# Patient Record
Sex: MALE | Race: WHITE | NOT HISPANIC OR LATINO | Employment: FULL TIME | URBAN - METROPOLITAN AREA
[De-identification: names, ages, dates, MRNs, and addresses within clinical notes are randomized per-mention and may not be internally consistent; named-entity substitution may affect disease eponyms.]

---

## 2017-07-11 ENCOUNTER — HOSPITAL ENCOUNTER (OUTPATIENT)
Dept: LAB | Facility: MEDICAL CENTER | Age: 52
End: 2017-07-11
Attending: EMERGENCY MEDICINE
Payer: COMMERCIAL

## 2017-07-11 LAB
ALBUMIN SERPL BCP-MCNC: 4 G/DL (ref 3.2–4.9)
ALBUMIN/GLOB SERPL: 1.7 G/DL
ALP SERPL-CCNC: 57 U/L (ref 30–99)
ALT SERPL-CCNC: 45 U/L (ref 2–50)
ANION GAP SERPL CALC-SCNC: 8 MMOL/L (ref 0–11.9)
AST SERPL-CCNC: 30 U/L (ref 12–45)
BASOPHILS # BLD AUTO: 0.8 % (ref 0–1.8)
BASOPHILS # BLD: 0.08 K/UL (ref 0–0.12)
BILIRUB SERPL-MCNC: 0.8 MG/DL (ref 0.1–1.5)
BUN SERPL-MCNC: 17 MG/DL (ref 8–22)
CALCIUM SERPL-MCNC: 9.5 MG/DL (ref 8.5–10.5)
CHLORIDE SERPL-SCNC: 106 MMOL/L (ref 96–112)
CHOLEST SERPL-MCNC: 113 MG/DL (ref 100–199)
CO2 SERPL-SCNC: 24 MMOL/L (ref 20–33)
CREAT SERPL-MCNC: 0.92 MG/DL (ref 0.5–1.4)
EOSINOPHIL # BLD AUTO: 0.28 K/UL (ref 0–0.51)
EOSINOPHIL NFR BLD: 2.9 % (ref 0–6.9)
ERYTHROCYTE [DISTWIDTH] IN BLOOD BY AUTOMATED COUNT: 46.6 FL (ref 35.9–50)
EST. AVERAGE GLUCOSE BLD GHB EST-MCNC: 117 MG/DL
ESTRADIOL SERPL-MCNC: 39 PG/ML
GFR SERPL CREATININE-BSD FRML MDRD: >60 ML/MIN/1.73 M 2
GGT SERPL-CCNC: 46 U/L (ref 7–51)
GLOBULIN SER CALC-MCNC: 2.4 G/DL (ref 1.9–3.5)
GLUCOSE SERPL-MCNC: 102 MG/DL (ref 65–99)
HBA1C MFR BLD: 5.7 % (ref 0–5.6)
HCT VFR BLD AUTO: 45.4 % (ref 42–52)
HCYS SERPL-SCNC: 13.06 UMOL/L
HDLC SERPL-MCNC: 50 MG/DL
HGB BLD-MCNC: 15.8 G/DL (ref 14–18)
IMM GRANULOCYTES # BLD AUTO: 0.05 K/UL (ref 0–0.11)
IMM GRANULOCYTES NFR BLD AUTO: 0.5 % (ref 0–0.9)
LDH SERPL-CCNC: 214 U/L (ref 107–266)
LDLC SERPL CALC-MCNC: 49 MG/DL
LYMPHOCYTES # BLD AUTO: 2.17 K/UL (ref 1–4.8)
LYMPHOCYTES NFR BLD: 22.5 % (ref 22–41)
MCH RBC QN AUTO: 30.9 PG (ref 27–33)
MCHC RBC AUTO-ENTMCNC: 34.8 G/DL (ref 33.7–35.3)
MCV RBC AUTO: 88.8 FL (ref 81.4–97.8)
MONOCYTES # BLD AUTO: 0.65 K/UL (ref 0–0.85)
MONOCYTES NFR BLD AUTO: 6.7 % (ref 0–13.4)
NEUTROPHILS # BLD AUTO: 6.4 K/UL (ref 1.82–7.42)
NEUTROPHILS NFR BLD: 66.6 % (ref 44–72)
NRBC # BLD AUTO: 0 K/UL
NRBC BLD AUTO-RTO: 0 /100 WBC
PHOSPHATE SERPL-MCNC: 3.3 MG/DL (ref 2.5–4.5)
PLATELET # BLD AUTO: 195 K/UL (ref 164–446)
PMV BLD AUTO: 11.6 FL (ref 9–12.9)
POTASSIUM SERPL-SCNC: 3.9 MMOL/L (ref 3.6–5.5)
PROT SERPL-MCNC: 6.4 G/DL (ref 6–8.2)
PSA SERPL-MCNC: 0.46 NG/ML (ref 0–4)
RBC # BLD AUTO: 5.11 M/UL (ref 4.7–6.1)
SODIUM SERPL-SCNC: 138 MMOL/L (ref 135–145)
T3FREE SERPL-MCNC: 2.96 PG/ML (ref 2.4–4.2)
TRIGL SERPL-MCNC: 72 MG/DL (ref 0–149)
URATE SERPL-MCNC: 7.8 MG/DL (ref 2.5–8.3)
WBC # BLD AUTO: 9.6 K/UL (ref 4.8–10.8)

## 2017-07-11 PROCEDURE — 82977 ASSAY OF GGT: CPT

## 2017-07-11 PROCEDURE — 84481 FREE ASSAY (FT-3): CPT

## 2017-07-11 PROCEDURE — 84305 ASSAY OF SOMATOMEDIN: CPT

## 2017-07-11 PROCEDURE — 84270 ASSAY OF SEX HORMONE GLOBUL: CPT

## 2017-07-11 PROCEDURE — 84100 ASSAY OF PHOSPHORUS: CPT

## 2017-07-11 PROCEDURE — 83615 LACTATE (LD) (LDH) ENZYME: CPT

## 2017-07-11 PROCEDURE — 80061 LIPID PANEL: CPT

## 2017-07-11 PROCEDURE — 80053 COMPREHEN METABOLIC PANEL: CPT

## 2017-07-11 PROCEDURE — 84153 ASSAY OF PSA TOTAL: CPT

## 2017-07-11 PROCEDURE — 83090 ASSAY OF HOMOCYSTEINE: CPT

## 2017-07-11 PROCEDURE — 82670 ASSAY OF TOTAL ESTRADIOL: CPT

## 2017-07-11 PROCEDURE — 85025 COMPLETE CBC W/AUTO DIFF WBC: CPT

## 2017-07-11 PROCEDURE — 36415 COLL VENOUS BLD VENIPUNCTURE: CPT

## 2017-07-11 PROCEDURE — 84402 ASSAY OF FREE TESTOSTERONE: CPT

## 2017-07-11 PROCEDURE — 84550 ASSAY OF BLOOD/URIC ACID: CPT

## 2017-07-11 PROCEDURE — 83036 HEMOGLOBIN GLYCOSYLATED A1C: CPT

## 2017-07-11 PROCEDURE — 84403 ASSAY OF TOTAL TESTOSTERONE: CPT

## 2017-07-13 LAB
IGF-I SERPL-MCNC: 99 NG/ML (ref 68–245)
SHBG SERPL-SCNC: 17 NMOL/L (ref 11–80)
TESTOST FREE MFR SERPL: 2.4 % (ref 1.6–2.9)
TESTOST FREE SERPL-MCNC: 70 PG/ML (ref 47–244)
TESTOST SERPL-MCNC: 290 NG/DL (ref 300–890)

## 2021-01-15 ENCOUNTER — APPOINTMENT (RX ONLY)
Dept: URBAN - METROPOLITAN AREA CLINIC 4 | Facility: CLINIC | Age: 56
Setting detail: DERMATOLOGY
End: 2021-01-15

## 2021-01-15 DIAGNOSIS — L82.1 OTHER SEBORRHEIC KERATOSIS: ICD-10-CM

## 2021-01-15 DIAGNOSIS — L81.4 OTHER MELANIN HYPERPIGMENTATION: ICD-10-CM

## 2021-01-15 DIAGNOSIS — D22 MELANOCYTIC NEVI: ICD-10-CM

## 2021-01-15 DIAGNOSIS — L71.8 OTHER ROSACEA: ICD-10-CM

## 2021-01-15 DIAGNOSIS — D18.0 HEMANGIOMA: ICD-10-CM

## 2021-01-15 DIAGNOSIS — L85.3 XEROSIS CUTIS: ICD-10-CM

## 2021-01-15 DIAGNOSIS — F42.4 EXCORIATION (SKIN-PICKING) DISORDER: ICD-10-CM

## 2021-01-15 DIAGNOSIS — I78.8 OTHER DISEASES OF CAPILLARIES: ICD-10-CM

## 2021-01-15 PROBLEM — D22.72 MELANOCYTIC NEVI OF LEFT LOWER LIMB, INCLUDING HIP: Status: ACTIVE | Noted: 2021-01-15

## 2021-01-15 PROBLEM — S00.80XA UNSPECIFIED SUPERFICIAL INJURY OF OTHER PART OF HEAD, INITIAL ENCOUNTER: Status: ACTIVE | Noted: 2021-01-15

## 2021-01-15 PROBLEM — D22.71 MELANOCYTIC NEVI OF RIGHT LOWER LIMB, INCLUDING HIP: Status: ACTIVE | Noted: 2021-01-15

## 2021-01-15 PROBLEM — D22.5 MELANOCYTIC NEVI OF TRUNK: Status: ACTIVE | Noted: 2021-01-15

## 2021-01-15 PROBLEM — D23.62 OTHER BENIGN NEOPLASM OF SKIN OF LEFT UPPER LIMB, INCLUDING SHOULDER: Status: ACTIVE | Noted: 2021-01-15

## 2021-01-15 PROBLEM — D22.62 MELANOCYTIC NEVI OF LEFT UPPER LIMB, INCLUDING SHOULDER: Status: ACTIVE | Noted: 2021-01-15

## 2021-01-15 PROBLEM — D22.61 MELANOCYTIC NEVI OF RIGHT UPPER LIMB, INCLUDING SHOULDER: Status: ACTIVE | Noted: 2021-01-15

## 2021-01-15 PROBLEM — D18.01 HEMANGIOMA OF SKIN AND SUBCUTANEOUS TISSUE: Status: ACTIVE | Noted: 2021-01-15

## 2021-01-15 PROBLEM — D23.71 OTHER BENIGN NEOPLASM OF SKIN OF RIGHT LOWER LIMB, INCLUDING HIP: Status: ACTIVE | Noted: 2021-01-15

## 2021-01-15 PROCEDURE — ? COUNSELING

## 2021-01-15 PROCEDURE — 99203 OFFICE O/P NEW LOW 30 MIN: CPT

## 2021-01-15 ASSESSMENT — LOCATION DETAILED DESCRIPTION DERM
LOCATION DETAILED: RIGHT MEDIAL MALAR CHEEK
LOCATION DETAILED: RIGHT ANTERIOR DISTAL THIGH
LOCATION DETAILED: RIGHT POSTERIOR SHOULDER
LOCATION DETAILED: RIGHT LATERAL SUPERIOR CHEST
LOCATION DETAILED: LEFT DISTAL POSTERIOR THIGH
LOCATION DETAILED: RIGHT VENTRAL PROXIMAL FOREARM
LOCATION DETAILED: LEFT POSTERIOR SHOULDER
LOCATION DETAILED: LEFT SUPERIOR LATERAL UPPER BACK
LOCATION DETAILED: RIGHT CENTRAL MALAR CHEEK
LOCATION DETAILED: RIGHT PROXIMAL CALF
LOCATION DETAILED: RIGHT PROXIMAL DORSAL FOREARM
LOCATION DETAILED: LEFT LATERAL SUPERIOR CHEST
LOCATION DETAILED: LEFT PROXIMAL POSTERIOR UPPER ARM
LOCATION DETAILED: LEFT MEDIAL MALAR CHEEK
LOCATION DETAILED: RIGHT VENTRAL DISTAL FOREARM
LOCATION DETAILED: LEFT MEDIAL SUPERIOR CHEST
LOCATION DETAILED: LEFT PROXIMAL CALF
LOCATION DETAILED: RIGHT DISTAL POSTERIOR THIGH
LOCATION DETAILED: PERIUMBILICAL SKIN
LOCATION DETAILED: LEFT INFERIOR CENTRAL MALAR CHEEK
LOCATION DETAILED: LEFT ANTERIOR DISTAL THIGH
LOCATION DETAILED: LEFT FOREHEAD
LOCATION DETAILED: LEFT LATERAL EYEBROW
LOCATION DETAILED: RIGHT MEDIAL INFERIOR CHEST
LOCATION DETAILED: LEFT VENTRAL DISTAL FOREARM
LOCATION DETAILED: LEFT VENTRAL PROXIMAL FOREARM
LOCATION DETAILED: RIGHT DISTAL POSTERIOR UPPER ARM
LOCATION DETAILED: LEFT PROXIMAL DORSAL FOREARM

## 2021-01-15 ASSESSMENT — LOCATION SIMPLE DESCRIPTION DERM
LOCATION SIMPLE: LEFT CHEEK
LOCATION SIMPLE: LEFT THIGH
LOCATION SIMPLE: RIGHT SHOULDER
LOCATION SIMPLE: CHEST
LOCATION SIMPLE: RIGHT THIGH
LOCATION SIMPLE: RIGHT POSTERIOR THIGH
LOCATION SIMPLE: RIGHT CALF
LOCATION SIMPLE: RIGHT UPPER ARM
LOCATION SIMPLE: LEFT FOREARM
LOCATION SIMPLE: LEFT UPPER BACK
LOCATION SIMPLE: LEFT EYEBROW
LOCATION SIMPLE: RIGHT CHEEK
LOCATION SIMPLE: LEFT POSTERIOR THIGH
LOCATION SIMPLE: LEFT SHOULDER
LOCATION SIMPLE: RIGHT FOREARM
LOCATION SIMPLE: LEFT CALF
LOCATION SIMPLE: ABDOMEN
LOCATION SIMPLE: LEFT FOREHEAD
LOCATION SIMPLE: LEFT UPPER ARM

## 2021-01-15 ASSESSMENT — LOCATION ZONE DERM
LOCATION ZONE: LEG
LOCATION ZONE: ARM
LOCATION ZONE: FACE
LOCATION ZONE: TRUNK

## 2021-03-15 DIAGNOSIS — Z23 NEED FOR VACCINATION: ICD-10-CM

## 2021-11-05 ENCOUNTER — HOSPITAL ENCOUNTER (OUTPATIENT)
Dept: LAB | Facility: MEDICAL CENTER | Age: 56
End: 2021-11-05
Attending: EMERGENCY MEDICINE
Payer: COMMERCIAL

## 2021-11-05 LAB
ALBUMIN SERPL BCP-MCNC: 4.4 G/DL (ref 3.2–4.9)
ALBUMIN/GLOB SERPL: 1.5 G/DL
ALP SERPL-CCNC: 50 U/L (ref 30–99)
ALT SERPL-CCNC: 36 U/L (ref 2–50)
ANION GAP SERPL CALC-SCNC: 14 MMOL/L (ref 7–16)
AST SERPL-CCNC: 27 U/L (ref 12–45)
BASOPHILS # BLD AUTO: 1.1 % (ref 0–1.8)
BASOPHILS # BLD: 0.09 K/UL (ref 0–0.12)
BILIRUB SERPL-MCNC: 0.8 MG/DL (ref 0.1–1.5)
BUN SERPL-MCNC: 11 MG/DL (ref 8–22)
CALCIUM SERPL-MCNC: 10.2 MG/DL (ref 8.5–10.5)
CHLORIDE SERPL-SCNC: 98 MMOL/L (ref 96–112)
CHOLEST SERPL-MCNC: 226 MG/DL (ref 100–199)
CO2 SERPL-SCNC: 26 MMOL/L (ref 20–33)
CREAT SERPL-MCNC: 1.14 MG/DL (ref 0.5–1.4)
EOSINOPHIL # BLD AUTO: 0.1 K/UL (ref 0–0.51)
EOSINOPHIL NFR BLD: 1.2 % (ref 0–6.9)
ERYTHROCYTE [DISTWIDTH] IN BLOOD BY AUTOMATED COUNT: 48.8 FL (ref 35.9–50)
ESTRADIOL SERPL-MCNC: 23.9 PG/ML
GLOBULIN SER CALC-MCNC: 2.9 G/DL (ref 1.9–3.5)
GLUCOSE SERPL-MCNC: 89 MG/DL (ref 65–99)
HCT VFR BLD AUTO: 58.9 % (ref 42–52)
HDLC SERPL-MCNC: 44 MG/DL
HGB BLD-MCNC: 19.9 G/DL (ref 14–18)
IMM GRANULOCYTES # BLD AUTO: 0.05 K/UL (ref 0–0.11)
IMM GRANULOCYTES NFR BLD AUTO: 0.6 % (ref 0–0.9)
LDLC SERPL CALC-MCNC: 161 MG/DL
LYMPHOCYTES # BLD AUTO: 1.8 K/UL (ref 1–4.8)
LYMPHOCYTES NFR BLD: 21.7 % (ref 22–41)
MCH RBC QN AUTO: 33.8 PG (ref 27–33)
MCHC RBC AUTO-ENTMCNC: 33.8 G/DL (ref 33.7–35.3)
MCV RBC AUTO: 100 FL (ref 81.4–97.8)
MONOCYTES # BLD AUTO: 0.61 K/UL (ref 0–0.85)
MONOCYTES NFR BLD AUTO: 7.3 % (ref 0–13.4)
NEUTROPHILS # BLD AUTO: 5.65 K/UL (ref 1.82–7.42)
NEUTROPHILS NFR BLD: 68.1 % (ref 44–72)
NRBC # BLD AUTO: 0 K/UL
NRBC BLD-RTO: 0 /100 WBC
PLATELET # BLD AUTO: 165 K/UL (ref 164–446)
PMV BLD AUTO: 12.3 FL (ref 9–12.9)
POTASSIUM SERPL-SCNC: 4.5 MMOL/L (ref 3.6–5.5)
PROT SERPL-MCNC: 7.3 G/DL (ref 6–8.2)
PSA SERPL-MCNC: 1.14 NG/ML (ref 0–4)
RBC # BLD AUTO: 5.89 M/UL (ref 4.7–6.1)
SODIUM SERPL-SCNC: 138 MMOL/L (ref 135–145)
TESTOST SERPL-MCNC: 712 NG/DL (ref 175–781)
TRIGL SERPL-MCNC: 104 MG/DL (ref 0–149)
WBC # BLD AUTO: 8.3 K/UL (ref 4.8–10.8)

## 2021-11-05 PROCEDURE — 84305 ASSAY OF SOMATOMEDIN: CPT

## 2021-11-05 PROCEDURE — 84403 ASSAY OF TOTAL TESTOSTERONE: CPT

## 2021-11-05 PROCEDURE — 80061 LIPID PANEL: CPT

## 2021-11-05 PROCEDURE — 85025 COMPLETE CBC W/AUTO DIFF WBC: CPT

## 2021-11-05 PROCEDURE — 36415 COLL VENOUS BLD VENIPUNCTURE: CPT

## 2021-11-05 PROCEDURE — 82670 ASSAY OF TOTAL ESTRADIOL: CPT

## 2021-11-05 PROCEDURE — 84153 ASSAY OF PSA TOTAL: CPT

## 2021-11-05 PROCEDURE — 80053 COMPREHEN METABOLIC PANEL: CPT

## 2021-11-07 LAB
IGF-I SERPL-MCNC: 114 NG/ML (ref 58–204)
IGF-I Z-SCORE SERPL: -0.1

## 2022-03-11 ENCOUNTER — TELEPHONE (OUTPATIENT)
Dept: CARDIOLOGY | Facility: MEDICAL CENTER | Age: 57
End: 2022-03-11
Payer: COMMERCIAL

## 2022-03-12 NOTE — TELEPHONE ENCOUNTER
Spoke with pt who confirmed last visit with cardio was at [facility], all records have been requested.  Confirmed we have all recent notes and testing in chart.    Pending records.

## 2022-03-16 ENCOUNTER — OFFICE VISIT (OUTPATIENT)
Dept: CARDIOLOGY | Facility: MEDICAL CENTER | Age: 57
End: 2022-03-16
Payer: COMMERCIAL

## 2022-03-16 VITALS
SYSTOLIC BLOOD PRESSURE: 130 MMHG | HEART RATE: 78 BPM | WEIGHT: 239 LBS | DIASTOLIC BLOOD PRESSURE: 80 MMHG | RESPIRATION RATE: 14 BRPM | HEIGHT: 76 IN | OXYGEN SATURATION: 97 % | BODY MASS INDEX: 29.1 KG/M2

## 2022-03-16 DIAGNOSIS — I25.10 CORONARY ARTERY DISEASE INVOLVING NATIVE CORONARY ARTERY OF NATIVE HEART WITHOUT ANGINA PECTORIS: ICD-10-CM

## 2022-03-16 DIAGNOSIS — E78.5 DYSLIPIDEMIA: ICD-10-CM

## 2022-03-16 DIAGNOSIS — I10 ESSENTIAL HYPERTENSION: ICD-10-CM

## 2022-03-16 LAB — EKG IMPRESSION: NORMAL

## 2022-03-16 PROCEDURE — 99204 OFFICE O/P NEW MOD 45 MIN: CPT | Performed by: INTERNAL MEDICINE

## 2022-03-16 PROCEDURE — 93000 ELECTROCARDIOGRAM COMPLETE: CPT | Performed by: INTERNAL MEDICINE

## 2022-03-16 RX ORDER — MINOCYCLINE HYDROCHLORIDE 100 MG/1
CAPSULE ORAL
COMMUNITY
Start: 2022-03-04 | End: 2023-09-29

## 2022-03-16 RX ORDER — ANASTROZOLE 1 MG/1
1 TABLET ORAL DAILY
COMMUNITY
End: 2023-09-29

## 2022-03-16 RX ORDER — ATORVASTATIN CALCIUM 80 MG/1
80 TABLET, FILM COATED ORAL EVERY EVENING
Qty: 90 TABLET | Refills: 3 | Status: SHIPPED | OUTPATIENT
Start: 2022-03-16 | End: 2023-09-22 | Stop reason: SDUPTHER

## 2022-03-16 RX ORDER — VALSARTAN AND HYDROCHLOROTHIAZIDE 320; 12.5 MG/1; MG/1
TABLET, FILM COATED ORAL
COMMUNITY
Start: 2022-02-02 | End: 2023-12-27

## 2022-03-16 RX ORDER — AMLODIPINE BESYLATE 2.5 MG/1
2.5 TABLET ORAL DAILY
COMMUNITY
Start: 2022-02-23 | End: 2023-02-23

## 2022-03-16 RX ORDER — ZOLPIDEM TARTRATE 10 MG/1
TABLET ORAL
COMMUNITY
Start: 2022-01-09

## 2022-03-16 RX ORDER — THYROID 60 MG/1
60 TABLET ORAL DAILY
COMMUNITY
End: 2023-12-14

## 2022-03-16 ASSESSMENT — ENCOUNTER SYMPTOMS
ORTHOPNEA: 0
DOUBLE VISION: 0
COUGH: 0
WEAKNESS: 0
DIARRHEA: 0
IRREGULAR HEARTBEAT: 0
PARESTHESIAS: 0
FEVER: 0
MYALGIAS: 0
PALPITATIONS: 0
BLOATING: 0
SYNCOPE: 0
BACK PAIN: 0
NIGHT SWEATS: 0
SHORTNESS OF BREATH: 0
DIAPHORESIS: 0
PND: 0
EYE REDNESS: 1
NEAR-SYNCOPE: 0
EXCESSIVE DAYTIME SLEEPINESS: 0
BLURRED VISION: 0
LIGHT-HEADEDNESS: 0
SORE THROAT: 0
DYSPNEA ON EXERTION: 0
LOSS OF BALANCE: 0
DECREASED APPETITE: 0
SLEEP DISTURBANCES DUE TO BREATHING: 0
FALLS: 0
CONSTIPATION: 0
HEADACHES: 0
DIZZINESS: 0
FLANK PAIN: 0
VOMITING: 0
NAUSEA: 0
WHEEZING: 0
NUMBNESS: 0

## 2022-03-16 ASSESSMENT — FIBROSIS 4 INDEX: FIB4 SCORE: 1.58

## 2022-03-16 NOTE — PROGRESS NOTES
Cardiology Initial Consultation Note    Date of note:    3/16/2022    Primary Care Provider: Fernando Taylor M.D.  Referring Provider: Fernando Taylor M.D    Patient Name: Phillip Jordan   YOB: 1965  MRN:              [id number]    Chief Complaint   Patient presents with   • Coronary Artery Disease   • Hyperlipidemia   • Hypertension       History of Present Illness: Mr. Phillip Jordan is a 56 y.o. male whose current medical problems include CAD with prior anterior MI with no stent placement, hypertension and dyslipidemia who is here for cardiac consultation for above.  Was previously seen by Dr. Penaloza with last visit in 2014.  Was being followed at [place] [facility] in the interim.    Patient states that he has been doing well from a cardiovascular perspective.  Has not had recurrent symptoms concerning for MI or accelerating angina.  He is currently on aspirin 81 mg daily and Lipitor 40 mg daily.  Was previously on Lipitor 80 but was reduced to 40 about 2 years ago due to well-controlled lipid panel.    In terms of physical activity, has not been exercising since Covid and has gained about 15 pounds.  Is a retired  lives with his wife who is a .    Cardiovascular Risk Factors:  1. Smoking status: Former smoker, quit 2009  2. Type II Diabetes Mellitus: No  Lab Results   Component Value Date/Time    HBA1C 5.7 (H) 07/11/2017 12:16 PM    HBA1C 6.2 (H) 01/28/2016 12:59 PM     3. Hypertension: Yes  4. Dyslipidemia: Yes  Cholesterol,Tot   Date Value Ref Range Status   11/05/2021 226 (H) 100 - 199 mg/dL Final     LDL   Date Value Ref Range Status   11/05/2021 161 (H) <100 mg/dL Final     HDL   Date Value Ref Range Status   11/05/2021 44 >=40 mg/dL Final     Triglycerides   Date Value Ref Range Status   11/05/2021 104 0 - 149 mg/dL Final     5. Family history of early Coronary Artery Disease: Father had a heart attack  6.  Obesity and/or Metabolic Syndrome: BMI  29  7. Sedentary lifestyle: No    Review of Systems   Constitutional: Negative for decreased appetite, diaphoresis, fever, malaise/fatigue and night sweats.   HENT: Negative for congestion and sore throat.    Eyes: Positive for redness. Negative for blurred vision and double vision.   Cardiovascular: Negative for chest pain, cyanosis, dyspnea on exertion, irregular heartbeat, leg swelling, near-syncope, orthopnea, palpitations, paroxysmal nocturnal dyspnea and syncope.   Respiratory: Negative for cough, shortness of breath, sleep disturbances due to breathing and wheezing.    Endocrine: Negative for cold intolerance and heat intolerance.   Musculoskeletal: Negative for back pain, falls and myalgias.   Gastrointestinal: Negative for bloating, constipation, diarrhea, nausea and vomiting.   Genitourinary: Negative for dysuria and flank pain.   Neurological: Negative for excessive daytime sleepiness, dizziness, headaches, light-headedness, loss of balance, numbness, paresthesias and weakness.         Past Medical History:   Diagnosis Date   • CAD (coronary artery disease) 6/6/2012   • Enlarged prostate    • Hyperlipidemia 6/6/2012   • Hypertension          Past Surgical History:   Procedure Laterality Date   • MASTECTOMY      Fpr gynecomastia, right breast   • OTHER NEUROLOGICAL SURG           Current Outpatient Medications   Medication Sig Dispense Refill   • VALACYCLOVIR HCL PO Take 1 mg by mouth.     • amLODIPine (NORVASC) 2.5 MG Tab Take 2.5 mg by mouth every day.     • anastrozole (ARIMIDEX) 1 MG Tab Take 1 mg by mouth every day.     • minocycline (MINOCIN) 100 MG Cap      • thyroid (ARMOUR THYROID) 60 MG Tab Take 60 mg by mouth every day.     • valsartan-hydrochlorothiazide (DIOVAN-HCT) 320-12.5 MG per tablet      • zolpidem (AMBIEN) 10 MG Tab      • aspirin EC (ECOTRIN) 81 MG Tablet Delayed Response Take 1 Tablet by mouth every day. 30 Tablet 0   • atorvastatin (LIPITOR) 80 MG tablet Take 1 Tablet by mouth every  "evening. 90 Tablet 3   • omeprazole (PRILOSEC) 40 MG capsule Take 40 mg by mouth every day.     • coenzyme Q-10 30 MG capsule Take 60 mg by mouth every day. (Patient not taking: Reported on 3/16/2022)       No current facility-administered medications for this visit.         Allergies   Allergen Reactions   • Nkda [No Known Drug Allergy]          History reviewed. No pertinent family history.      Social History     Socioeconomic History   • Marital status:      Spouse name: Not on file   • Number of children: Not on file   • Years of education: Not on file   • Highest education level: Not on file   Occupational History   • Not on file   Tobacco Use   • Smoking status: Former Smoker     Quit date: 2009     Years since quittin.7   • Smokeless tobacco: Never Used   Substance and Sexual Activity   • Alcohol use: Yes     Alcohol/week: 8.4 oz     Types: 14 Glasses of wine per week     Comment: 2 glass of wine daily    • Drug use: No   • Sexual activity: Not on file   Other Topics Concern   • Not on file   Social History Narrative   • Not on file     Social Determinants of Health     Financial Resource Strain: Not on file   Food Insecurity: Not on file   Transportation Needs: Not on file   Physical Activity: Not on file   Stress: Not on file   Social Connections: Not on file   Intimate Partner Violence: Not on file   Housing Stability: Not on file         Physical Exam:  Ambulatory Vitals  /80 (BP Location: Left arm, Patient Position: Sitting, BP Cuff Size: Adult)   Pulse 78   Resp 14   Ht 1.93 m (6' 4\")   Wt 108 kg (239 lb)   SpO2 97%    Oxygen Therapy:  Pulse Oximetry: 97 %  BP Readings from Last 4 Encounters:   22 130/80   14 118/70   13 130/80   04/15/13 159/96       Weight/BMI: Body mass index is 29.09 kg/m².  Wt Readings from Last 4 Encounters:   22 108 kg (239 lb)   14 107 kg (236 lb 6.4 oz)   13 108 kg (238 lb)   04/15/13 104 kg (230 lb)       General: " Well appearing and in no apparent distress  Eyes: nl conjunctiva, no icteric sclera  ENT: wearing a mask, normal external appearance of ears  Neck: no visible JVP,  no carotid bruits  Lungs: normal respiratory effort, CTAB  Heart: RRR, no murmurs, no rubs or gallops,  no edema bilateral lower extremities. No LV/RV heave on cardiac palpatation. 2+ bilateral radial pulses.  2+ bilateral dp pulses.   Abdomen: soft, non tender, non distended, no masses, normal bowel sounds.  No HSM.  Extremities/MSK: no clubbing, no cyanosis  Neurological: No focal sensory deficits  Psychiatric: Appropriate affect, A/O x 3, intact judgement and insight  Skin: Warm extremities      Lab Data Review:  Lab Results   Component Value Date/Time    CHOLSTRLTOT 226 (H) 11/05/2021 12:15 PM     (H) 11/05/2021 12:15 PM    HDL 44 11/05/2021 12:15 PM    TRIGLYCERIDE 104 11/05/2021 12:15 PM       Lab Results   Component Value Date/Time    SODIUM 138 11/05/2021 12:15 PM    POTASSIUM 4.5 11/05/2021 12:15 PM    CHLORIDE 98 11/05/2021 12:15 PM    CO2 26 11/05/2021 12:15 PM    GLUCOSE 89 11/05/2021 12:15 PM    BUN 11 11/05/2021 12:15 PM    CREATININE 1.14 11/05/2021 12:15 PM    CREATININE 1.1 02/26/2009 04:35 AM     Lab Results   Component Value Date/Time    ALKPHOSPHAT 50 11/05/2021 12:15 PM    ASTSGOT 27 11/05/2021 12:15 PM    ALTSGPT 36 11/05/2021 12:15 PM    TBILIRUBIN 0.8 11/05/2021 12:15 PM      Lab Results   Component Value Date/Time    WBC 8.3 11/05/2021 12:15 PM     Lab Results   Component Value Date/Time    HBA1C 5.7 (H) 07/11/2017 12:16 PM    HBA1C 6.2 (H) 01/28/2016 12:59 PM         Cardiac Imaging and Procedures Review:    EKG dated 3/16/2022: My personal interpretation is sinus rhythm    Echo dated 10/9/2019:  <Conclusion>   1. The left ventricle is normal in size and function with no regional wall motion abnormalities and a   n ejection fraction of 55-60% by Dobson's biplane.   2. There are no significant valvular abnormalities  noted.   3. The right ventricle is normal in size, function, and pressure.    [facility] (10/23/2020):   IMPRESSION:   1. Patchy and reduced uptake of the inferior wall which is fixed and suggest   previous inferior infarction.  No reversible defects to suggest ischemia.   2. Ejection fraction calculated at 54%.   3. Global hypokinesis noted.      [facility] (2/27/2009):   IMPRESSION  1.  Single-vessel coronary artery disease with borderline stenosis of  the proximal-mid left anterior descending coronary artery consistent  with ruptured plaque.  2.  Normal left ventricular cineangiogram.  3.  Mild elevation in left heart pressures at rest.        Assessment & Plan     1. Coronary artery disease involving native coronary artery of native heart without angina pectoris  aspirin EC (ECOTRIN) 81 MG Tablet Delayed Response    EC-ECHOCARDIOGRAM COMPLETE W/O CONT    NM-CARDIAC STRESS TEST    atorvastatin (LIPITOR) 80 MG tablet   2. Essential hypertension  EKG    EC-ECHOCARDIOGRAM COMPLETE W/O CONT   3. Dyslipidemia  atorvastatin (LIPITOR) 80 MG tablet    Lipid Profile         Shared Medical Decision Making:  Obtain transthoracic echocardiogram to evaluate underlying cardiac structure and function given history of MI.  Rule out structural or valvular heart abnormality.    Obtain treadmill nuclear cardiac stress test given CAD with known 60 to 70% LAD stenosis.      Patient has not been exercising of late.  Discussed ACC/AHA guidelines of cardio focused exercise with reaching at least 85% of your maximum predicted heart rate for a minimum of 150 minutes/week to maintain healthy weight and reduce cardiovascular risk factors for atherosclerotic heart disease.    Lipid panel reviewed with the patient which shows significantly elevated LDL.  Is currently on Lipitor 40 mg daily, increase Lipitor to 80 mg daily.  Repeat lipid panel in 3 months.    Continue aspirin 81 mg daily.    Blood pressure well controlled.  Continue  amlodipine 2.5 mg and valsartan-hydrochlorothiazide 320-12.5 mg daily.      All of patient's excellent questions were answered to the best of my knowledge and to his satisfaction.  It was a pleasure seeing Mr. Phillip Jordan in my clinic today. Return in about 1 year (around 3/16/2023). Patient is aware to call the cardiology clinic with any questions or concerns.      Shaq Felder MD  [facility]  [facility], [facility]  [address] [address]  Phone: [phone]  Fax: [phone]    Please note that this dictation was created using voice recognition software. I have made every reasonable attempt to correct obvious errors, but it is possible there are errors of grammar and possibly content that I did not discover before finalizing the note.

## 2022-03-26 ENCOUNTER — OFFICE VISIT (OUTPATIENT)
Dept: URGENT CARE | Facility: CLINIC | Age: 57
End: 2022-03-26
Payer: COMMERCIAL

## 2022-03-26 ENCOUNTER — HOSPITAL ENCOUNTER (EMERGENCY)
Facility: MEDICAL CENTER | Age: 57
End: 2022-03-26
Attending: EMERGENCY MEDICINE
Payer: COMMERCIAL

## 2022-03-26 VITALS
WEIGHT: 242.51 LBS | BODY MASS INDEX: 29.53 KG/M2 | SYSTOLIC BLOOD PRESSURE: 153 MMHG | DIASTOLIC BLOOD PRESSURE: 95 MMHG | HEART RATE: 67 BPM | HEIGHT: 76 IN | RESPIRATION RATE: 18 BRPM | OXYGEN SATURATION: 96 % | TEMPERATURE: 97.7 F

## 2022-03-26 VITALS
OXYGEN SATURATION: 96 % | HEART RATE: 78 BPM | RESPIRATION RATE: 16 BRPM | BODY MASS INDEX: 29.59 KG/M2 | HEIGHT: 76 IN | DIASTOLIC BLOOD PRESSURE: 88 MMHG | SYSTOLIC BLOOD PRESSURE: 146 MMHG | TEMPERATURE: 98 F | WEIGHT: 243 LBS

## 2022-03-26 DIAGNOSIS — H11.32 SUBCONJUNCTIVAL HEMATOMA, LEFT: ICD-10-CM

## 2022-03-26 DIAGNOSIS — H57.9 EYE PRESSURE: ICD-10-CM

## 2022-03-26 DIAGNOSIS — H11.32 SUBCONJUNCTIVAL HEMORRHAGE OF LEFT EYE: ICD-10-CM

## 2022-03-26 PROCEDURE — 99283 EMERGENCY DEPT VISIT LOW MDM: CPT

## 2022-03-26 PROCEDURE — 99284 EMERGENCY DEPT VISIT MOD MDM: CPT

## 2022-03-26 PROCEDURE — 99213 OFFICE O/P EST LOW 20 MIN: CPT | Performed by: PHYSICIAN ASSISTANT

## 2022-03-26 PROCEDURE — 700101 HCHG RX REV CODE 250: Performed by: EMERGENCY MEDICINE

## 2022-03-26 RX ORDER — PROPARACAINE HYDROCHLORIDE 5 MG/ML
1 SOLUTION/ DROPS OPHTHALMIC ONCE
Status: COMPLETED | OUTPATIENT
Start: 2022-03-26 | End: 2022-03-26

## 2022-03-26 RX ADMIN — PROPARACAINE HYDROCHLORIDE 1 DROP: 5 SOLUTION/ DROPS OPHTHALMIC at 17:15

## 2022-03-26 ASSESSMENT — VISUAL ACUITY
OS_CC: 20/20
OD_CC: 20/25

## 2022-03-26 ASSESSMENT — ENCOUNTER SYMPTOMS
BLURRED VISION: 0
EYE PAIN: 1
DOUBLE VISION: 0

## 2022-03-26 ASSESSMENT — FIBROSIS 4 INDEX
FIB4 SCORE: 1.58
FIB4 SCORE: 1.58

## 2022-03-26 NOTE — ED PROVIDER NOTES
"ED Provider Note    CHIEF COMPLAINT  Chief Complaint   Patient presents with   • Eye Problem     Seen at 4:42 PM    HPI  Phillip Jordan is a 56 y.o. male who presents with a subconjunctival hemorrhage of the left eye.  The patient first noted the hemorrhage when he awoke in the morning 4 days ago.  It gradually started to improve but over the last 48 hours it worsened.  He notes what he believes to be a blister developing on the lateral aspect of the sclera of the eye, he states that has increased about 4 times in size since he first noticed it this morning.  He does not wear contact lenses, he does wear corrective lenses.  He denies any visual changes.  He uses eyedrops for red eye on a daily basis as recommended by his ophthalmologist.    He denies any recent heavy lifting, coughing, vomiting or straining.  He does take 162 mg of aspirin daily, no other anticoagulation.  He denies any eye pain.  He does feel that there may be some increased pressure in the eye secondary to the bleeding.    REVIEW OF SYSTEMS  See HPI  PAST MEDICAL HISTORY   has a past medical history of CAD (coronary artery disease) (2012), Enlarged prostate, Hyperlipidemia (2012), and Hypertension.    SOCIAL HISTORY  Social History     Tobacco Use   • Smoking status: Former Smoker     Quit date: 2009     Years since quittin.8   • Smokeless tobacco: Never Used   Substance and Sexual Activity   • Alcohol use: Yes     Alcohol/week: 8.4 oz     Types: 14 Glasses of wine per week     Comment: 2 glass of wine daily    • Drug use: No   • Sexual activity: Not on file       SURGICAL HISTORY   has a past surgical history that includes other neurological surg and mastectomy.    CURRENT MEDICATIONS  Reviewed.  See Encounter Summary.     ALLERGIES  Allergies   Allergen Reactions   • Nkda [No Known Drug Allergy]        PHYSICAL EXAM  VITAL SIGNS: /95   Pulse 67   Temp 36.5 °C (97.7 °F) (Temporal)   Resp 18   Ht 1.93 m (6' 4\")   Wt " 110 kg (242 lb 8.1 oz)   SpO2 96%   BMI 29.52 kg/m²   Constitutional: Awake, alert in no apparent distress.  HENT: Normocephalic, Bilateral external ears normal. Nose normal.     Eyes: Conjunctiva normal, no icterus.  EOMI.  PERRLA, no APD.  IOP 11, left eye.  The patient has 100% subconjunctival hemorrhage on the left eye with a deeper red aspect noted at the 6 o'clock position.  There does appear to be a mass/growth/hematoma on the lateral aspect of the sclera near the lateral canthus.  Thorax & Lungs: Easy unlabored respirations  Cardiovascular: Regular rate  Abdomen:  No distention  Skin: Visualized skin is  Dry, No erythema, No rash.   Extremities:   atraumatic   Neurologic: Alert, Grossly non-focal.   Psychiatric: Affect and Mood normal    RADIOLOGY  No orders to display       Nursing notes and vital signs were reviewed. (See chart for details)    5:09 PM: Ophthalmology paged for advice/consultation.    5:11 PM Case discussed with Dr. Cleveland.  He recommends discontinuing the eyedrops he is on and take a saline eyedrop alone.  He also recommends discontinuing the aspirin (the patient takes it for prior history of NSTEMI, he has never had any stents in the past).  The subconjunctival hemorrhage will likely take a month to resolve.  The swelling appreciated on examination is consistent with developing chemosis which is not unusual given the degree of hemorrhage.    Decision Making:  This is a pleasant 56 y.o. year old male who presents with 360 degrees of subconjunctival hemorrhage.  The patient's visual acuity is normal.  Intraocular pressures are normal.  He is on antiplatelet agents.  I discussed case with ophthalmology.  There is no additional concerns at this time.  Recommend discontinuing the antiplatelet agent as above, follow-up in 30 days if he does not have any resolution.    DISPOSITION:  Patient will be discharged home in good condition.    Discharge Medications:  Discharge Medication List as of  3/26/2022  5:30 PM          The patient was discharged home (see d/c instructions) and told to return immediately for any signs or symptoms listed, or any worsening at all.  The patient verbally agreed to the discharge precautions and follow-up plan which is documented in EPIC.        FINAL IMPRESSION  1. Subconjunctival hemorrhage of left eye

## 2022-03-26 NOTE — ED TRIAGE NOTES
"Pt is referred to our facility from [facility] for further evaluation and management of left eye conjunctival hemorrhage worsening since this past Tuesday.  He is not able to identify any possible causative factors.  Chief Complaint   Patient presents with   • Eye Problem     /95   Pulse 77   Temp 36.3 °C (97.4 °F) (Temporal)   Resp 18   Ht 1.93 m (6' 4\")   Wt 110 kg (242 lb 8.1 oz)   SpO2 96%   BMI 29.52 kg/m²   Has this patient been vaccinated for COVID YES  If not, would they like to be vaccinated while in the ER if eligible?  N/A  Would the patient like to speak with the ERP about the possibility of receiving the COVID vaccine today before making a decision? N/A     "

## 2022-03-26 NOTE — PROGRESS NOTES
"Subjective:   Phillip Jordan is a 56 y.o. male who presents for Conjunctivitis (Very red, possibly bloody, blister is forming, x 5 days)        Patient presents with concerns of left eye redness and forming blistering left eye that has been present for the last 5 days.  He states that he woke up with mild localized redness on Tuesday morning.  Symptoms gradually worsened throughout the week and began to encompass most of the eye.  However today he noticed worsening bleeding and rapidly progressing blistering, prompting him to seek medical evaluation.  He denies pain but states that he feels \"significant pressure in the eye.\"  He denies headaches, blurred vision, nausea, vomiting, fevers, chills.  He takes  mg p.o. daily.  He is not on any other blood thinners or antiplatelets.  He denies history of remote or recent trauma to the eye.  He denies personal and family history of bleeding disorders.  Denies similar symptoms in the past.      Review of Systems   Eyes: Positive for pain (\"pressure\"). Negative for blurred vision and double vision.       PMH:  has a past medical history of CAD (coronary artery disease) (6/6/2012), Enlarged prostate, Hyperlipidemia (6/6/2012), and Hypertension.  MEDS:   Current Outpatient Medications:   •  VALACYCLOVIR HCL PO, Take 1 mg by mouth., Disp: , Rfl:   •  amLODIPine (NORVASC) 2.5 MG Tab, Take 2.5 mg by mouth every day., Disp: , Rfl:   •  anastrozole (ARIMIDEX) 1 MG Tab, Take 1 mg by mouth every day., Disp: , Rfl:   •  minocycline (MINOCIN) 100 MG Cap, , Disp: , Rfl:   •  thyroid (ARMOUR THYROID) 60 MG Tab, Take 60 mg by mouth every day., Disp: , Rfl:   •  valsartan-hydrochlorothiazide (DIOVAN-HCT) 320-12.5 MG per tablet, , Disp: , Rfl:   •  zolpidem (AMBIEN) 10 MG Tab, , Disp: , Rfl:   •  aspirin EC (ECOTRIN) 81 MG Tablet Delayed Response, Take 1 Tablet by mouth every day., Disp: 30 Tablet, Rfl: 0  •  atorvastatin (LIPITOR) 80 MG tablet, Take 1 Tablet by mouth every " "evening., Disp: 90 Tablet, Rfl: 3  •  omeprazole (PRILOSEC) 40 MG capsule, Take 40 mg by mouth every day., Disp: , Rfl:   •  coenzyme Q-10 30 MG capsule, Take 60 mg by mouth every day. (Patient not taking: No sig reported), Disp: , Rfl:   ALLERGIES:   Allergies   Allergen Reactions   • Nkda [No Known Drug Allergy]      SURGHX:   Past Surgical History:   Procedure Laterality Date   • MASTECTOMY      Fpr gynecomastia, right breast   • OTHER NEUROLOGICAL SURG       SOCHX:  reports that he quit smoking about 12 years ago. He has never used smokeless tobacco. He reports current alcohol use of about 8.4 oz of alcohol per week. He reports that he does not use drugs.  FH: Family history was reviewed, no pertinent findings to report   Objective:   /88 (BP Location: Left arm, Patient Position: Sitting, BP Cuff Size: Adult long)   Pulse 78   Temp 36.7 °C (98 °F) (Temporal)   Resp 16   Ht 1.93 m (6' 4\")   Wt 110 kg (243 lb)   SpO2 96%   BMI 29.58 kg/m²   Physical Exam  Vitals reviewed.   Constitutional:       General: He is not in acute distress.     Appearance: Normal appearance. He is well-developed. He is not toxic-appearing.   HENT:      Head: Normocephalic and atraumatic.      Right Ear: External ear normal.      Left Ear: External ear normal.      Nose: Nose normal.   Eyes:      Comments: PERRL, EOMI, bilaterally.  Anterior chambers clear and nonbulging, bilaterally, on gross examination.  Patient has extensive subconjunctival hemorrhage of the left eye with what appears to possibly be subconjunctival blood pooling at the inferolateral aspect of the conjunctiva.   Cardiovascular:      Rate and Rhythm: Normal rate and regular rhythm.   Pulmonary:      Effort: Pulmonary effort is normal. No respiratory distress.      Breath sounds: No stridor.   Skin:     General: Skin is dry.   Neurological:      Comments: Alert and oriented.    Psychiatric:         Speech: Speech normal.         Behavior: Behavior normal. "           Assessment/Plan:   1. Subconjunctival hematoma, left    2. Eye pressure    Pt has what looks to be subconjunctival hemorrhage x 5 days.  Pt advised that while this is normally a benign condition without underlying pathology, I am concerned by development of eye pressure, rapidly worsening sx and pooling blood today. Recommend further evaluation at a higher level of care. Pt will go to [facility] ED for further evaluation and management.    Differential diagnosis, natural history, supportive care, and indications for immediate follow-up discussed.

## 2022-03-27 NOTE — ED NOTES
Pt provided with discharge paper work and follow  Up care instructions. PT declines questions. Pt ambulated out of ER without complications.

## 2022-06-14 ENCOUNTER — HOSPITAL ENCOUNTER (OUTPATIENT)
Dept: RADIOLOGY | Facility: MEDICAL CENTER | Age: 57
End: 2022-06-14
Attending: INTERNAL MEDICINE
Payer: COMMERCIAL

## 2022-06-14 ENCOUNTER — HOSPITAL ENCOUNTER (OUTPATIENT)
Dept: CARDIOLOGY | Facility: MEDICAL CENTER | Age: 57
End: 2022-06-14
Attending: INTERNAL MEDICINE
Payer: COMMERCIAL

## 2022-06-14 DIAGNOSIS — I25.10 CORONARY ARTERY DISEASE INVOLVING NATIVE CORONARY ARTERY OF NATIVE HEART WITHOUT ANGINA PECTORIS: ICD-10-CM

## 2022-06-14 DIAGNOSIS — I10 ESSENTIAL HYPERTENSION: ICD-10-CM

## 2022-06-14 PROCEDURE — A9502 TC99M TETROFOSMIN: HCPCS

## 2022-06-14 PROCEDURE — 93306 TTE W/DOPPLER COMPLETE: CPT

## 2022-06-15 LAB
LV EJECT FRACT  99904: 65
LV EJECT FRACT MOD 2C 99903: 70.93
LV EJECT FRACT MOD 4C 99902: 72.52
LV EJECT FRACT MOD BP 99901: 71.75

## 2022-06-15 PROCEDURE — 93306 TTE W/DOPPLER COMPLETE: CPT | Mod: 26 | Performed by: INTERNAL MEDICINE

## 2022-06-15 PROCEDURE — 93018 CV STRESS TEST I&R ONLY: CPT | Performed by: INTERNAL MEDICINE

## 2022-06-15 PROCEDURE — 78452 HT MUSCLE IMAGE SPECT MULT: CPT | Mod: 26 | Performed by: INTERNAL MEDICINE

## 2022-07-14 ENCOUNTER — HOSPITAL ENCOUNTER (OUTPATIENT)
Dept: LAB | Facility: MEDICAL CENTER | Age: 57
End: 2022-07-14
Attending: EMERGENCY MEDICINE
Payer: COMMERCIAL

## 2022-07-14 LAB
ALBUMIN SERPL BCP-MCNC: 4.4 G/DL (ref 3.2–4.9)
ALBUMIN/GLOB SERPL: 1.9 G/DL
ALP SERPL-CCNC: 50 U/L (ref 30–99)
ALT SERPL-CCNC: 39 U/L (ref 2–50)
ANION GAP SERPL CALC-SCNC: 12 MMOL/L (ref 7–16)
AST SERPL-CCNC: 29 U/L (ref 12–45)
BASOPHILS # BLD AUTO: 0.9 % (ref 0–1.8)
BASOPHILS # BLD: 0.08 K/UL (ref 0–0.12)
BILIRUB SERPL-MCNC: 0.6 MG/DL (ref 0.1–1.5)
BUN SERPL-MCNC: 10 MG/DL (ref 8–22)
CALCIUM SERPL-MCNC: 9.8 MG/DL (ref 8.5–10.5)
CHLORIDE SERPL-SCNC: 100 MMOL/L (ref 96–112)
CHOLEST SERPL-MCNC: 123 MG/DL (ref 100–199)
CO2 SERPL-SCNC: 27 MMOL/L (ref 20–33)
CREAT SERPL-MCNC: 0.88 MG/DL (ref 0.5–1.4)
EOSINOPHIL # BLD AUTO: 0.18 K/UL (ref 0–0.51)
EOSINOPHIL NFR BLD: 2 % (ref 0–6.9)
ERYTHROCYTE [DISTWIDTH] IN BLOOD BY AUTOMATED COUNT: 45.4 FL (ref 35.9–50)
ESTRADIOL SERPL-MCNC: 99.6 PG/ML
GFR SERPLBLD CREATININE-BSD FMLA CKD-EPI: 99 ML/MIN/1.73 M 2
GLOBULIN SER CALC-MCNC: 2.3 G/DL (ref 1.9–3.5)
GLUCOSE SERPL-MCNC: 102 MG/DL (ref 65–99)
HCT VFR BLD AUTO: 46.5 % (ref 42–52)
HCYS SERPL-SCNC: 13.76 UMOL/L
HDLC SERPL-MCNC: 35 MG/DL
HGB BLD-MCNC: 15.6 G/DL (ref 14–18)
IMM GRANULOCYTES # BLD AUTO: 0.05 K/UL (ref 0–0.11)
IMM GRANULOCYTES NFR BLD AUTO: 0.6 % (ref 0–0.9)
LDLC SERPL CALC-MCNC: 72 MG/DL
LYMPHOCYTES # BLD AUTO: 1.95 K/UL (ref 1–4.8)
LYMPHOCYTES NFR BLD: 21.9 % (ref 22–41)
MCH RBC QN AUTO: 31.8 PG (ref 27–33)
MCHC RBC AUTO-ENTMCNC: 33.5 G/DL (ref 33.7–35.3)
MCV RBC AUTO: 94.9 FL (ref 81.4–97.8)
MONOCYTES # BLD AUTO: 0.79 K/UL (ref 0–0.85)
MONOCYTES NFR BLD AUTO: 8.9 % (ref 0–13.4)
NEUTROPHILS # BLD AUTO: 5.85 K/UL (ref 1.82–7.42)
NEUTROPHILS NFR BLD: 65.7 % (ref 44–72)
NRBC # BLD AUTO: 0 K/UL
NRBC BLD-RTO: 0 /100 WBC
PLATELET # BLD AUTO: 231 K/UL (ref 164–446)
PMV BLD AUTO: 11.1 FL (ref 9–12.9)
POTASSIUM SERPL-SCNC: 3.9 MMOL/L (ref 3.6–5.5)
PROT SERPL-MCNC: 6.7 G/DL (ref 6–8.2)
PSA SERPL-MCNC: 0.69 NG/ML (ref 0–4)
RBC # BLD AUTO: 4.9 M/UL (ref 4.7–6.1)
SODIUM SERPL-SCNC: 139 MMOL/L (ref 135–145)
TRIGL SERPL-MCNC: 82 MG/DL (ref 0–149)
WBC # BLD AUTO: 8.9 K/UL (ref 4.8–10.8)

## 2022-07-14 PROCEDURE — 82670 ASSAY OF TOTAL ESTRADIOL: CPT

## 2022-07-14 PROCEDURE — 83090 ASSAY OF HOMOCYSTEINE: CPT

## 2022-07-14 PROCEDURE — 84270 ASSAY OF SEX HORMONE GLOBUL: CPT

## 2022-07-14 PROCEDURE — 80061 LIPID PANEL: CPT

## 2022-07-14 PROCEDURE — 80053 COMPREHEN METABOLIC PANEL: CPT

## 2022-07-14 PROCEDURE — 84402 ASSAY OF FREE TESTOSTERONE: CPT

## 2022-07-14 PROCEDURE — 84305 ASSAY OF SOMATOMEDIN: CPT

## 2022-07-14 PROCEDURE — 84153 ASSAY OF PSA TOTAL: CPT

## 2022-07-14 PROCEDURE — 84403 ASSAY OF TOTAL TESTOSTERONE: CPT

## 2022-07-14 PROCEDURE — 85025 COMPLETE CBC W/AUTO DIFF WBC: CPT

## 2022-07-14 PROCEDURE — 36415 COLL VENOUS BLD VENIPUNCTURE: CPT

## 2022-07-17 LAB
IGF-I SERPL-MCNC: 104 NG/ML (ref 56–203)
IGF-I Z-SCORE SERPL: -0.4
SHBG SERPL-SCNC: 20 NMOL/L (ref 19–76)
TESTOST FREE MFR SERPL: 2.7 % (ref 1.6–2.9)
TESTOST FREE SERPL-MCNC: 383 PG/ML (ref 47–244)
TESTOST SERPL-MCNC: 1397 NG/DL (ref 300–890)

## 2023-09-22 DIAGNOSIS — E78.5 DYSLIPIDEMIA: ICD-10-CM

## 2023-09-22 DIAGNOSIS — I25.10 CORONARY ARTERY DISEASE INVOLVING NATIVE CORONARY ARTERY OF NATIVE HEART WITHOUT ANGINA PECTORIS: ICD-10-CM

## 2023-09-22 RX ORDER — ATORVASTATIN CALCIUM 80 MG/1
80 TABLET, FILM COATED ORAL EVERY EVENING
Qty: 90 TABLET | Refills: 0 | Status: SHIPPED | OUTPATIENT
Start: 2023-09-22 | End: 2023-09-28 | Stop reason: SDUPTHER

## 2023-09-27 SDOH — ECONOMIC STABILITY: FOOD INSECURITY: WITHIN THE PAST 12 MONTHS, THE FOOD YOU BOUGHT JUST DIDN'T LAST AND YOU DIDN'T HAVE MONEY TO GET MORE.: NEVER TRUE

## 2023-09-27 SDOH — ECONOMIC STABILITY: TRANSPORTATION INSECURITY
IN THE PAST 12 MONTHS, HAS THE LACK OF TRANSPORTATION KEPT YOU FROM MEDICAL APPOINTMENTS OR FROM GETTING MEDICATIONS?: NO

## 2023-09-27 SDOH — ECONOMIC STABILITY: HOUSING INSECURITY
IN THE LAST 12 MONTHS, WAS THERE A TIME WHEN YOU DID NOT HAVE A STEADY PLACE TO SLEEP OR SLEPT IN A SHELTER (INCLUDING NOW)?: NO

## 2023-09-27 SDOH — ECONOMIC STABILITY: TRANSPORTATION INSECURITY
IN THE PAST 12 MONTHS, HAS LACK OF TRANSPORTATION KEPT YOU FROM MEETINGS, WORK, OR FROM GETTING THINGS NEEDED FOR DAILY LIVING?: NO

## 2023-09-27 SDOH — HEALTH STABILITY: PHYSICAL HEALTH: ON AVERAGE, HOW MANY MINUTES DO YOU ENGAGE IN EXERCISE AT THIS LEVEL?: 90 MIN

## 2023-09-27 SDOH — ECONOMIC STABILITY: INCOME INSECURITY: IN THE LAST 12 MONTHS, WAS THERE A TIME WHEN YOU WERE NOT ABLE TO PAY THE MORTGAGE OR RENT ON TIME?: NO

## 2023-09-27 SDOH — ECONOMIC STABILITY: HOUSING INSECURITY: IN THE LAST 12 MONTHS, HOW MANY PLACES HAVE YOU LIVED?: 1

## 2023-09-27 SDOH — ECONOMIC STABILITY: INCOME INSECURITY: HOW HARD IS IT FOR YOU TO PAY FOR THE VERY BASICS LIKE FOOD, HOUSING, MEDICAL CARE, AND HEATING?: NOT VERY HARD

## 2023-09-27 SDOH — HEALTH STABILITY: PHYSICAL HEALTH: ON AVERAGE, HOW MANY DAYS PER WEEK DO YOU ENGAGE IN MODERATE TO STRENUOUS EXERCISE (LIKE A BRISK WALK)?: 6 DAYS

## 2023-09-27 SDOH — ECONOMIC STABILITY: FOOD INSECURITY: WITHIN THE PAST 12 MONTHS, YOU WORRIED THAT YOUR FOOD WOULD RUN OUT BEFORE YOU GOT MONEY TO BUY MORE.: NEVER TRUE

## 2023-09-27 SDOH — ECONOMIC STABILITY: TRANSPORTATION INSECURITY
IN THE PAST 12 MONTHS, HAS LACK OF RELIABLE TRANSPORTATION KEPT YOU FROM MEDICAL APPOINTMENTS, MEETINGS, WORK OR FROM GETTING THINGS NEEDED FOR DAILY LIVING?: NO

## 2023-09-27 SDOH — HEALTH STABILITY: MENTAL HEALTH
STRESS IS WHEN SOMEONE FEELS TENSE, NERVOUS, ANXIOUS, OR CAN'T SLEEP AT NIGHT BECAUSE THEIR MIND IS TROUBLED. HOW STRESSED ARE YOU?: TO SOME EXTENT

## 2023-09-27 ASSESSMENT — SOCIAL DETERMINANTS OF HEALTH (SDOH)
HOW OFTEN DO YOU GET TOGETHER WITH FRIENDS OR RELATIVES?: ONCE A WEEK
HOW OFTEN DO YOU HAVE A DRINK CONTAINING ALCOHOL: 4 OR MORE TIMES A WEEK
HOW OFTEN DO YOU ATTEND CHURCH OR RELIGIOUS SERVICES?: NEVER
DO YOU BELONG TO ANY CLUBS OR ORGANIZATIONS SUCH AS CHURCH GROUPS UNIONS, FRATERNAL OR ATHLETIC GROUPS, OR SCHOOL GROUPS?: NO
IN A TYPICAL WEEK, HOW MANY TIMES DO YOU TALK ON THE PHONE WITH FAMILY, FRIENDS, OR NEIGHBORS?: TWICE A WEEK
HOW OFTEN DO YOU GET TOGETHER WITH FRIENDS OR RELATIVES?: ONCE A WEEK
IN A TYPICAL WEEK, HOW MANY TIMES DO YOU TALK ON THE PHONE WITH FAMILY, FRIENDS, OR NEIGHBORS?: TWICE A WEEK
HOW OFTEN DO YOU ATTENT MEETINGS OF THE CLUB OR ORGANIZATION YOU BELONG TO?: PATIENT DECLINED
DO YOU BELONG TO ANY CLUBS OR ORGANIZATIONS SUCH AS CHURCH GROUPS UNIONS, FRATERNAL OR ATHLETIC GROUPS, OR SCHOOL GROUPS?: NO
HOW OFTEN DO YOU ATTEND CHURCH OR RELIGIOUS SERVICES?: NEVER
HOW OFTEN DO YOU HAVE SIX OR MORE DRINKS ON ONE OCCASION: NEVER
HOW HARD IS IT FOR YOU TO PAY FOR THE VERY BASICS LIKE FOOD, HOUSING, MEDICAL CARE, AND HEATING?: NOT VERY HARD
HOW OFTEN DO YOU ATTENT MEETINGS OF THE CLUB OR ORGANIZATION YOU BELONG TO?: PATIENT DECLINED
HOW MANY DRINKS CONTAINING ALCOHOL DO YOU HAVE ON A TYPICAL DAY WHEN YOU ARE DRINKING: 1 OR 2
WITHIN THE PAST 12 MONTHS, YOU WORRIED THAT YOUR FOOD WOULD RUN OUT BEFORE YOU GOT THE MONEY TO BUY MORE: NEVER TRUE

## 2023-09-27 ASSESSMENT — LIFESTYLE VARIABLES
HOW MANY STANDARD DRINKS CONTAINING ALCOHOL DO YOU HAVE ON A TYPICAL DAY: 1 OR 2
HOW OFTEN DO YOU HAVE A DRINK CONTAINING ALCOHOL: 4 OR MORE TIMES A WEEK
AUDIT-C TOTAL SCORE: 4
SKIP TO QUESTIONS 9-10: 1
HOW OFTEN DO YOU HAVE SIX OR MORE DRINKS ON ONE OCCASION: NEVER

## 2023-09-28 DIAGNOSIS — E78.5 DYSLIPIDEMIA: ICD-10-CM

## 2023-09-28 DIAGNOSIS — I25.10 CORONARY ARTERY DISEASE INVOLVING NATIVE CORONARY ARTERY OF NATIVE HEART WITHOUT ANGINA PECTORIS: ICD-10-CM

## 2023-09-28 RX ORDER — ATORVASTATIN CALCIUM 80 MG/1
80 TABLET, FILM COATED ORAL EVERY EVENING
Qty: 90 TABLET | Refills: 0 | Status: SHIPPED | OUTPATIENT
Start: 2023-09-28 | End: 2023-09-29 | Stop reason: SDUPTHER

## 2023-09-29 ENCOUNTER — OFFICE VISIT (OUTPATIENT)
Dept: MEDICAL GROUP | Facility: MEDICAL CENTER | Age: 58
End: 2023-09-29
Payer: COMMERCIAL

## 2023-09-29 VITALS
HEART RATE: 79 BPM | TEMPERATURE: 96.8 F | SYSTOLIC BLOOD PRESSURE: 120 MMHG | DIASTOLIC BLOOD PRESSURE: 76 MMHG | WEIGHT: 242.4 LBS | HEIGHT: 76 IN | OXYGEN SATURATION: 96 % | BODY MASS INDEX: 29.52 KG/M2

## 2023-09-29 DIAGNOSIS — B00.9 HERPES: ICD-10-CM

## 2023-09-29 DIAGNOSIS — N40.0 ENLARGED PROSTATE: ICD-10-CM

## 2023-09-29 DIAGNOSIS — Z23 NEED FOR VACCINATION: ICD-10-CM

## 2023-09-29 DIAGNOSIS — Z11.59 ENCOUNTER FOR HEPATITIS C SCREENING TEST FOR LOW RISK PATIENT: ICD-10-CM

## 2023-09-29 DIAGNOSIS — E78.5 DYSLIPIDEMIA: ICD-10-CM

## 2023-09-29 DIAGNOSIS — Z12.11 SCREENING FOR COLORECTAL CANCER: ICD-10-CM

## 2023-09-29 DIAGNOSIS — F51.01 PRIMARY INSOMNIA: ICD-10-CM

## 2023-09-29 DIAGNOSIS — R73.03 PREDIABETES: ICD-10-CM

## 2023-09-29 DIAGNOSIS — Z12.5 PROSTATE CANCER SCREENING: ICD-10-CM

## 2023-09-29 DIAGNOSIS — I25.10 CORONARY ARTERY DISEASE INVOLVING NATIVE CORONARY ARTERY OF NATIVE HEART WITHOUT ANGINA PECTORIS: Chronic | ICD-10-CM

## 2023-09-29 DIAGNOSIS — E03.9 HYPOTHYROIDISM, UNSPECIFIED TYPE: ICD-10-CM

## 2023-09-29 DIAGNOSIS — Z12.12 SCREENING FOR COLORECTAL CANCER: ICD-10-CM

## 2023-09-29 DIAGNOSIS — K21.9 GASTROESOPHAGEAL REFLUX DISEASE, UNSPECIFIED WHETHER ESOPHAGITIS PRESENT: ICD-10-CM

## 2023-09-29 PROCEDURE — 90472 IMMUNIZATION ADMIN EACH ADD: CPT | Performed by: STUDENT IN AN ORGANIZED HEALTH CARE EDUCATION/TRAINING PROGRAM

## 2023-09-29 PROCEDURE — 90715 TDAP VACCINE 7 YRS/> IM: CPT | Performed by: STUDENT IN AN ORGANIZED HEALTH CARE EDUCATION/TRAINING PROGRAM

## 2023-09-29 PROCEDURE — 99214 OFFICE O/P EST MOD 30 MIN: CPT | Mod: 25 | Performed by: STUDENT IN AN ORGANIZED HEALTH CARE EDUCATION/TRAINING PROGRAM

## 2023-09-29 PROCEDURE — 90471 IMMUNIZATION ADMIN: CPT | Performed by: STUDENT IN AN ORGANIZED HEALTH CARE EDUCATION/TRAINING PROGRAM

## 2023-09-29 PROCEDURE — 3078F DIAST BP <80 MM HG: CPT | Performed by: STUDENT IN AN ORGANIZED HEALTH CARE EDUCATION/TRAINING PROGRAM

## 2023-09-29 PROCEDURE — 3074F SYST BP LT 130 MM HG: CPT | Performed by: STUDENT IN AN ORGANIZED HEALTH CARE EDUCATION/TRAINING PROGRAM

## 2023-09-29 PROCEDURE — 90686 IIV4 VACC NO PRSV 0.5 ML IM: CPT | Performed by: STUDENT IN AN ORGANIZED HEALTH CARE EDUCATION/TRAINING PROGRAM

## 2023-09-29 RX ORDER — ASPIRIN 81 MG/1
81 TABLET ORAL DAILY
COMMUNITY

## 2023-09-29 RX ORDER — OMEPRAZOLE 20 MG/1
20 CAPSULE, DELAYED RELEASE ORAL DAILY
Qty: 90 CAPSULE | Refills: 3 | Status: SHIPPED | OUTPATIENT
Start: 2023-09-29

## 2023-09-29 RX ORDER — ATORVASTATIN CALCIUM 80 MG/1
80 TABLET, FILM COATED ORAL EVERY EVENING
Qty: 90 TABLET | Refills: 3 | Status: SHIPPED | OUTPATIENT
Start: 2023-09-29

## 2023-09-29 ASSESSMENT — ENCOUNTER SYMPTOMS
VOMITING: 0
HEADACHES: 0
DIZZINESS: 0
CHILLS: 0
FEVER: 0
WEIGHT LOSS: 0
PALPITATIONS: 0
WHEEZING: 0
NAUSEA: 0
SHORTNESS OF BREATH: 0

## 2023-09-29 ASSESSMENT — FIBROSIS 4 INDEX: FIB4 SCORE: 1.19

## 2023-09-29 ASSESSMENT — PATIENT HEALTH QUESTIONNAIRE - PHQ9: CLINICAL INTERPRETATION OF PHQ2 SCORE: 0

## 2023-09-29 NOTE — PROGRESS NOTES
"Subjective:     CC:     HPI:   Phillip presents today with    Former patient of [facility]  Seen in urgent care 3/26/2022    Following cardiology 3/16/2022 for CAD, HLD, hypertension, former -recommend to increase atorvastatin to 80 mg and repeat lipid panel, continue aspirin, continue amlodipine 2.5, valsartan hydrochlorothiazide 320-12.5 daily, history of CAD known 60 to 70% LAD stenosis  Hx of MI 2009    Hx of TRT  - for antiaging  - hx of HRT0.9 cc weekly, 3 month on, off 1 month  - anastrazole        Problem   Primary Insomnia    Takes zolipidem 10mg  As needed 1-2 times per month      Herpes    Valacyclovir as needed     Prediabetes   Cad (Coronary Artery Disease)    CAD, hx of prior MI 2013   On asa and atorvastatin 80mg.      Hypothyroidism    Has been on [place] Thyroid since 2009  TSH not regularly checked     Enlarged Prostate    Hx of reported enlarge prostate in 20s... was on TRT for antiaging. PSA level      Other Chest Pain (Resolved)   Other Chest Pain (Resolved)       Health Maintenance:     ROS:  Review of Systems   Constitutional:  Negative for chills, fever and weight loss.   HENT:  Negative for hearing loss.    Respiratory:  Negative for shortness of breath and wheezing.    Cardiovascular:  Negative for chest pain and palpitations.   Gastrointestinal:  Negative for nausea and vomiting.   Genitourinary:  Negative for frequency and urgency.   Skin:  Negative for rash.   Neurological:  Negative for dizziness and headaches.       Objective:     Exam:  /76 (BP Location: Left arm, Patient Position: Sitting)   Pulse 79   Temp 36 °C (96.8 °F) (Temporal)   Ht 1.93 m (6' 4\")   Wt 110 kg (242 lb 6.4 oz)   SpO2 96%   BMI 29.51 kg/m²  Body mass index is 29.51 kg/m².    Physical Exam  Constitutional:       Appearance: Normal appearance.   Cardiovascular:      Rate and Rhythm: Normal rate and regular rhythm.   Pulmonary:      Effort: Pulmonary effort is normal.      Breath sounds: Normal breath " sounds.   Musculoskeletal:      Cervical back: Normal range of motion and neck supple.   Lymphadenopathy:      Cervical: No cervical adenopathy.   Neurological:      Mental Status: He is alert.             Labs:     Assessment & Plan:     57 y.o. male with the following -     1. Primary insomnia  Chronic, stable  On zolpidem  We will follow-up once he needs a refill of zolpidem    2. Coronary artery disease involving native coronary artery of native heart without angina pectoris  Chronic, stable  Recommend patient restart aspirin  Aspirin was stopped after subconjunctival hemorrhage episodes  plan  - atorvastatin (LIPITOR) 80 MG tablet; Take 1 Tablet by mouth every evening.  Dispense: 90 Tablet; Refill: 3    3. Enlarged prostate  History of documented enlarged prostate  History of TRT with [facility] now off    4. Need for vaccination    - Tdap =>6yo IM  - INFLUENZA VACCINE QUAD INJ (PF)    5. Screening for colorectal cancer    - COLOGUARD (FIT DNA)    6. Dyslipidemia  Chronic, stable  In setting of CAD  - atorvastatin (LIPITOR) 80 MG tablet; Take 1 Tablet by mouth every evening.  Dispense: 90 Tablet; Refill: 3    7. Gastroesophageal reflux disease, unspecified whether esophagitis present  Chronic, stable  We will trial omeprazole 20 mg instead of 40 mg  - omeprazole (PRILOSEC) 20 MG delayed-release capsule; Take 1 Capsule by mouth every day.  Dispense: 90 Capsule; Refill: 3    8. Hypothyroidism, unspecified type  Chronic, stable  Etiology unknown  Has been on [place] Thyroid since 2009  TSH last checked 7 years ago  - TSH WITH REFLEX TO FT4; Future    9. Herpes  History of            Return in about 4 months (around 1/29/2024).    Please note that this dictation was created using voice recognition software. I have made every reasonable attempt to correct obvious errors, but I expect that there are errors of grammar and possibly content that I did not discover before finalizing the note.

## 2023-12-14 DIAGNOSIS — E03.9 HYPOTHYROIDISM, UNSPECIFIED TYPE: Chronic | ICD-10-CM

## 2023-12-14 RX ORDER — THYROID 60 MG
60 TABLET ORAL EVERY MORNING
Qty: 90 TABLET | Refills: 0 | Status: SHIPPED | OUTPATIENT
Start: 2023-12-14

## 2023-12-27 RX ORDER — VALSARTAN AND HYDROCHLOROTHIAZIDE 320; 12.5 MG/1; MG/1
1 TABLET, FILM COATED ORAL
Qty: 90 TABLET | Refills: 3 | Status: SHIPPED | OUTPATIENT
Start: 2023-12-27

## 2023-12-27 RX ORDER — AMLODIPINE BESYLATE 2.5 MG/1
2.5 TABLET ORAL
Qty: 90 TABLET | Refills: 3 | Status: SHIPPED | OUTPATIENT
Start: 2023-12-27

## 2024-04-09 ENCOUNTER — OFFICE VISIT (OUTPATIENT)
Dept: MEDICAL GROUP | Facility: MEDICAL CENTER | Age: 59
End: 2024-04-09
Payer: COMMERCIAL

## 2024-04-09 VITALS
TEMPERATURE: 97.8 F | BODY MASS INDEX: 30.74 KG/M2 | HEART RATE: 63 BPM | OXYGEN SATURATION: 97 % | WEIGHT: 252.4 LBS | SYSTOLIC BLOOD PRESSURE: 142 MMHG | DIASTOLIC BLOOD PRESSURE: 80 MMHG | HEIGHT: 76 IN

## 2024-04-09 DIAGNOSIS — E66.9 OBESITY (BMI 30-39.9): ICD-10-CM

## 2024-04-09 DIAGNOSIS — B02.30 HERPES ZOSTER WITH OPHTHALMIC COMPLICATION, UNSPECIFIED HERPES ZOSTER EYE DISEASE: ICD-10-CM

## 2024-04-09 DIAGNOSIS — H61.23 IMPACTED CERUMEN OF BOTH EARS: ICD-10-CM

## 2024-04-09 DIAGNOSIS — H93.8X1 PRESSURE SENSATION IN RIGHT EAR: ICD-10-CM

## 2024-04-09 PROCEDURE — 69210 REMOVE IMPACTED EAR WAX UNI: CPT

## 2024-04-09 PROCEDURE — 3079F DIAST BP 80-89 MM HG: CPT

## 2024-04-09 PROCEDURE — 99214 OFFICE O/P EST MOD 30 MIN: CPT | Mod: 25

## 2024-04-09 PROCEDURE — 3077F SYST BP >= 140 MM HG: CPT

## 2024-04-09 RX ORDER — VALACYCLOVIR HYDROCHLORIDE 1 G/1
1000 TABLET, FILM COATED ORAL 3 TIMES DAILY
Qty: 21 TABLET | Refills: 1 | Status: SHIPPED | OUTPATIENT
Start: 2024-04-09 | End: 2024-04-22

## 2024-04-09 RX ORDER — VALACYCLOVIR HYDROCHLORIDE 1 G/1
1 TABLET, FILM COATED ORAL
Status: CANCELLED | OUTPATIENT
Start: 2024-04-09

## 2024-04-09 RX ORDER — GABAPENTIN 100 MG/1
100 CAPSULE ORAL 3 TIMES DAILY
Qty: 42 CAPSULE | Refills: 1 | Status: SHIPPED | OUTPATIENT
Start: 2024-04-09 | End: 2024-04-23

## 2024-04-09 ASSESSMENT — FIBROSIS 4 INDEX: FIB4 SCORE: 1.21

## 2024-04-09 ASSESSMENT — PATIENT HEALTH QUESTIONNAIRE - PHQ9: CLINICAL INTERPRETATION OF PHQ2 SCORE: 0

## 2024-04-09 ASSESSMENT — ENCOUNTER SYMPTOMS
ORTHOPNEA: 0
COUGH: 0
PALPITATIONS: 0
CHILLS: 0
SHORTNESS OF BREATH: 0
FEVER: 0

## 2024-04-09 NOTE — PROGRESS NOTES
"Subjective:     Verbal consent was acquired by the patient to use [employer] ambient listening note generation during this visit Yes    CC: Follow-Up (Pt states he woke up with Shingles on his right eye this morning. Pt would like prescription for Valacyclovir. Pt states he has been having pain and fullness in his right ear for about 3 weeks. Pt would like to discuss weight.)      HPI:   Phillip is a 58 y.o. male who presents today for:    History of Present Illness       ***    Allergies: Nkda [no known drug allergy]     Medications:   Current Outpatient Medications:     valsartan-hydrochlorothiazide (DIOVAN-HCT) 320-12.5 MG per tablet, TAKE 1 TABLET BY MOUTH EVERY DAY, Disp: 90 Tablet, Rfl: 3    amLODIPine (NORVASC) 2.5 MG Tab, TAKE 1 TABLET BY MOUTH EVERY DAY, Disp: 90 Tablet, Rfl: 3    ARMOUR THYROID 60 MG Tab, TAKE 1 TABLET BY MOUTH EVERY MORNING, Disp: 90 Tablet, Rfl: 0    aspirin 81 MG EC tablet, Take 81 mg by mouth every day., Disp: , Rfl:     atorvastatin (LIPITOR) 80 MG tablet, Take 1 Tablet by mouth every evening., Disp: 90 Tablet, Rfl: 3    omeprazole (PRILOSEC) 20 MG delayed-release capsule, Take 1 Capsule by mouth every day., Disp: 90 Capsule, Rfl: 3    VALACYCLOVIR HCL PO, Take 1 mg by mouth., Disp: , Rfl:     coenzyme Q-10 30 MG capsule, Take 60 mg by mouth every day., Disp: , Rfl:     zolpidem (AMBIEN) 10 MG Tab, , Disp: , Rfl:       ROS:  Review of Systems   Constitutional:  Negative for chills and fever.   Respiratory:  Negative for cough and shortness of breath.    Cardiovascular:  Negative for chest pain, palpitations, orthopnea and leg swelling.   ***    Objective:     Exam:  BP (!) 142/80   Pulse 63   Temp 36.6 °C (97.8 °F) (Temporal)   Ht 1.93 m (6' 4\")   Wt 114 kg (252 lb 6.4 oz)   SpO2 97%   BMI 30.72 kg/m²  Body mass index is 30.72 kg/m².    Physical Exam  Constitutional:       Appearance: He is normal weight.   Eyes:      Pupils: Pupils are equal, round, and reactive to light. "   Cardiovascular:      Rate and Rhythm: Normal rate and regular rhythm.      Pulses: Normal pulses.      Heart sounds: Normal heart sounds.   Pulmonary:      Effort: Pulmonary effort is normal.      Breath sounds: Normal breath sounds.   Neurological:      Mental Status: He is alert and oriented to person, place, and time.   Psychiatric:         Mood and Affect: Mood normal.         Behavior: Behavior normal.         Assessment & Plan:     Phillip eagle 58 y.o. male with the following -     Assessment & Plan      There are no diagnoses linked to this encounter.      Anticipatory guidance included the following: Patient counseled about skin care, diet, supplements, smoking, drugs/alcohol use, safe sex and exercise.     No follow-ups on file.    Please note that this dictation was created using voice recognition software. I have made every reasonable attempt to correct obvious errors, but I expect that there are errors of grammar and possibly content that I did not discover before finalizing the note.      I have placed the below orders and discussed them with an approved delegating provider.  The MA is performing the below orders under the direction of ***.

## 2024-04-09 NOTE — PROGRESS NOTES
Subjective:     Verbal consent was acquired by the patient to use [employer] ambient listening note generation during this visit Yes    CC: Follow-Up (Pt states he woke up with Shingles on his right eye this morning. Pt would like prescription for Valacyclovir. Pt states he has been having pain and fullness in his right ear for about 3 weeks. Pt would like to discuss weight.)      HPI:   Phillip is a 58 y.o. male who presents today for:    History of Present Illness  The patient presents for evaluation of multiple medical concerns.    Facial pain: The patient, who celebrated his anniversary the previous night, he awoke this morning with an outbreak of shingles on his face, above his right eye. He has experienced this twice in the past in the exact same location. The outbreak is reminiscent of the previous episodes. He denies any visual disturbances. The outbreak is characterized by itching, burning, and itching. He has been prescribed Valtrex in the past for his shingles outbreak, he would like a refill today.     Ear pain: The patient reports difficulty hearing in his right ear, a condition he has been previously diagnosed with due to ear wax accumulation. He has attempted to alleviate the condition by using olive oil, which he has been using for the past 3 weeks without success.    Obesity: The patient expresses interest in Ozempic for weight loss. Despite walking 3 miles daily and lifting weights, he struggles with weight management. He adheres to a low-carbohydrate diet, but his weight has not improved. He is scheduled for blood work later this week.         Allergies: Nkda [no known drug allergy]     Medications:   Current Outpatient Medications:     gabapentin (NEURONTIN) 100 MG Cap, Take 1 Capsule by mouth 3 times a day for 14 days., Disp: 42 Capsule, Rfl: 1    valacyclovir (VALTREX) 1 GM Tab, Take 1 Tablet by mouth 3 times a day for 7 days., Disp: 21 Tablet, Rfl: 1    carbamide peroxide (DEBROX) 6.5 %  "Solution, Administer 6 Drops into affected ear(s) 2 times a day. Administer drops in both ears., Disp: 15 mL, Rfl: 2    valsartan-hydrochlorothiazide (DIOVAN-HCT) 320-12.5 MG per tablet, TAKE 1 TABLET BY MOUTH EVERY DAY, Disp: 90 Tablet, Rfl: 3    amLODIPine (NORVASC) 2.5 MG Tab, TAKE 1 TABLET BY MOUTH EVERY DAY, Disp: 90 Tablet, Rfl: 3    ARMOUR THYROID 60 MG Tab, TAKE 1 TABLET BY MOUTH EVERY MORNING, Disp: 90 Tablet, Rfl: 0    aspirin 81 MG EC tablet, Take 81 mg by mouth every day., Disp: , Rfl:     atorvastatin (LIPITOR) 80 MG tablet, Take 1 Tablet by mouth every evening., Disp: 90 Tablet, Rfl: 3    omeprazole (PRILOSEC) 20 MG delayed-release capsule, Take 1 Capsule by mouth every day., Disp: 90 Capsule, Rfl: 3    VALACYCLOVIR HCL PO, Take 1 mg by mouth., Disp: , Rfl:     coenzyme Q-10 30 MG capsule, Take 60 mg by mouth every day., Disp: , Rfl:     zolpidem (AMBIEN) 10 MG Tab, , Disp: , Rfl:       ROS:  Review of Systems   Constitutional:  Negative for chills and fever.   HENT:  Positive for hearing loss.    Respiratory:  Negative for cough and shortness of breath.    Cardiovascular:  Negative for chest pain, palpitations, orthopnea and leg swelling.       Objective:     Exam:  BP (!) 142/80   Pulse 63   Temp 36.6 °C (97.8 °F) (Temporal)   Ht 1.93 m (6' 4\")   Wt 114 kg (252 lb 6.4 oz)   SpO2 97%   BMI 30.72 kg/m²  Body mass index is 30.72 kg/m².    Physical Exam  HENT:      Right Ear: There is impacted cerumen.      Left Ear: There is impacted cerumen.           Assessment & Plan:     Phillip a 58 y.o. male with the following -     1. Herpes zoster with ophthalmic complication, unspecified herpes zoster eye disease  Acute, uncomplicated  A prescription for Valacyclovir, to be taken thrice daily for a duration of 7 days, has been issued. Additionally, gabapentin has been prescribed for pain management. The patient has been advised to monitor his vision. Should the shingles appear to close proximity to the " patient's eye or if the patient experiences any visual changes, he is advised to seek immediate medical attention at the Emergency Room or Dr. Lake (PCP), at which point a referral to an ophthalmologist can be arranged.  - gabapentin (NEURONTIN) 100 MG Cap; Take 1 Capsule by mouth 3 times a day for 14 days.  Dispense: 42 Capsule; Refill: 1  - valacyclovir (VALTREX) 1 GM Tab; Take 1 Tablet by mouth 3 times a day for 7 days.  Dispense: 21 Tablet; Refill: 1    2. Pressure sensation in right ear  3. Impacted cerumen of both ears  Acute, uncomplicated  Bilateral ears will be irrigated in the clinic today.   - carbamide peroxide (DEBROX) 6.5 % Solution; Administer 6 Drops into affected ear(s) 2 times a day. Administer drops in both ears.  Dispense: 15 mL; Refill: 2    4. Obesity (BMI 30-39.9)  Chronic, stable  A discussion was held regarding the use of Ozempic. Recommended that patient complete requested blood work that was ordered by PCP and follow up with PCP once labs are done for further discussion.     Procedure: Cerumen Removal  Risks and benefits of procedure discussed with patient.  Cerumen removed with lavage by the MA. Patient tolerated the procedure well    Post-lavage curette was performed by EULALIO Dumont. Post procedure exam showed residual cerumen in bilateral ear cannals. Debrox prescribed for patient.     Pt educated about proper care of ear canal. Q-tip cleaning discouraged, use of debrox and warm water lavage discussed.    Code 64039    Anticipatory guidance included the following: Patient counseled about skin care, diet, supplements, smoking, drugs/alcohol use, safe sex and exercise.     Return if symptoms worsen or fail to improve.    Please note that this dictation was created using voice recognition software. I have made every reasonable attempt to correct obvious errors, but I expect that there are errors of grammar and possibly content that I did not discover before finalizing the  note.      I have placed the below orders and discussed them with an approved delegating provider.  The MA is performing the below orders under the direction of Dr. Colon.

## 2024-04-12 DIAGNOSIS — E03.9 HYPOTHYROIDISM, UNSPECIFIED TYPE: Chronic | ICD-10-CM

## 2024-04-12 NOTE — TELEPHONE ENCOUNTER
Received request via: Pharmacy    Was the patient seen in the last year in this department? Yes    Does the patient have an active prescription (recently filled or refills available) for medication(s) requested? No    Pharm[facility] Name: [facility]    Does the patient have care home Plus and need 100 day supply (blood pressure, diabetes and cholesterol meds only)? Patient does not have SCP

## 2024-04-15 RX ORDER — THYROID 60 MG
60 TABLET ORAL EVERY MORNING
Qty: 90 TABLET | Refills: 0 | Status: SHIPPED | OUTPATIENT
Start: 2024-04-15

## 2024-04-21 DIAGNOSIS — B02.30 HERPES ZOSTER WITH OPHTHALMIC COMPLICATION, UNSPECIFIED HERPES ZOSTER EYE DISEASE: ICD-10-CM

## 2024-04-22 RX ORDER — VALACYCLOVIR HYDROCHLORIDE 1 G/1
1000 TABLET, FILM COATED ORAL DAILY
Qty: 90 TABLET | Refills: 3 | Status: SHIPPED | OUTPATIENT
Start: 2024-04-22

## 2024-05-13 ENCOUNTER — APPOINTMENT (OUTPATIENT)
Dept: MEDICAL GROUP | Facility: MEDICAL CENTER | Age: 59
End: 2024-05-13
Payer: COMMERCIAL

## 2024-05-21 ENCOUNTER — APPOINTMENT (OUTPATIENT)
Dept: MEDICAL GROUP | Facility: MEDICAL CENTER | Age: 59
End: 2024-05-21
Payer: COMMERCIAL

## 2024-06-29 DIAGNOSIS — E03.9 HYPOTHYROIDISM, UNSPECIFIED TYPE: Chronic | ICD-10-CM

## 2024-07-01 RX ORDER — THYROID 60 MG
60 TABLET ORAL EVERY MORNING
Qty: 90 TABLET | Refills: 0 | Status: SHIPPED | OUTPATIENT
Start: 2024-07-01

## 2024-09-26 DIAGNOSIS — E03.9 HYPOTHYROIDISM, UNSPECIFIED TYPE: Chronic | ICD-10-CM

## 2024-09-26 RX ORDER — THYROID 60 MG
60 TABLET ORAL EVERY MORNING
Qty: 90 TABLET | Refills: 0 | Status: SHIPPED | OUTPATIENT
Start: 2024-09-26

## 2024-09-26 NOTE — TELEPHONE ENCOUNTER
Received request via: Pharmacy    Was the patient seen in the last year in this department? Yes    Does the patient have an active prescription (recently filled or refills available) for medication(s) requested? No    Pharmacy Name:   To be filled at: [id number] - [place], [place] - [address]              Does the patient have retirement Plus and need 100-day supply? (This applies to ALL medications) Patient does not have SCP

## 2024-10-18 DIAGNOSIS — E78.5 DYSLIPIDEMIA: ICD-10-CM

## 2024-10-18 DIAGNOSIS — I25.10 CORONARY ARTERY DISEASE INVOLVING NATIVE CORONARY ARTERY OF NATIVE HEART WITHOUT ANGINA PECTORIS: Chronic | ICD-10-CM

## 2024-10-21 RX ORDER — ATORVASTATIN CALCIUM 80 MG/1
80 TABLET, FILM COATED ORAL EVERY EVENING
Qty: 90 TABLET | Refills: 3 | Status: SHIPPED | OUTPATIENT
Start: 2024-10-21

## 2024-11-07 ENCOUNTER — OFFICE VISIT (OUTPATIENT)
Dept: MEDICAL GROUP | Facility: MEDICAL CENTER | Age: 59
End: 2024-11-07
Payer: COMMERCIAL

## 2024-11-07 VITALS
DIASTOLIC BLOOD PRESSURE: 90 MMHG | OXYGEN SATURATION: 96 % | SYSTOLIC BLOOD PRESSURE: 142 MMHG | RESPIRATION RATE: 22 BRPM | HEIGHT: 76 IN | WEIGHT: 261.47 LBS | HEART RATE: 77 BPM | TEMPERATURE: 97.4 F | BODY MASS INDEX: 31.84 KG/M2

## 2024-11-07 DIAGNOSIS — I10 PRIMARY HYPERTENSION: ICD-10-CM

## 2024-11-07 DIAGNOSIS — I25.10 CORONARY ARTERY DISEASE INVOLVING NATIVE CORONARY ARTERY OF NATIVE HEART WITHOUT ANGINA PECTORIS: Chronic | ICD-10-CM

## 2024-11-07 DIAGNOSIS — E03.9 HYPOTHYROIDISM, UNSPECIFIED TYPE: Chronic | ICD-10-CM

## 2024-11-07 DIAGNOSIS — B02.30 HERPES ZOSTER WITH OPHTHALMIC COMPLICATION, UNSPECIFIED HERPES ZOSTER EYE DISEASE: ICD-10-CM

## 2024-11-07 DIAGNOSIS — F51.01 PRIMARY INSOMNIA: ICD-10-CM

## 2024-11-07 DIAGNOSIS — E66.9 OBESITY (BMI 30-39.9): ICD-10-CM

## 2024-11-07 DIAGNOSIS — Z12.5 PROSTATE CANCER SCREENING: ICD-10-CM

## 2024-11-07 DIAGNOSIS — Z23 NEED FOR VACCINATION: ICD-10-CM

## 2024-11-07 DIAGNOSIS — K21.9 GASTROESOPHAGEAL REFLUX DISEASE, UNSPECIFIED WHETHER ESOPHAGITIS PRESENT: ICD-10-CM

## 2024-11-07 DIAGNOSIS — E78.5 DYSLIPIDEMIA: ICD-10-CM

## 2024-11-07 PROCEDURE — 90471 IMMUNIZATION ADMIN: CPT

## 2024-11-07 PROCEDURE — 3080F DIAST BP >= 90 MM HG: CPT | Performed by: STUDENT IN AN ORGANIZED HEALTH CARE EDUCATION/TRAINING PROGRAM

## 2024-11-07 PROCEDURE — 90656 IIV3 VACC NO PRSV 0.5 ML IM: CPT

## 2024-11-07 PROCEDURE — 99214 OFFICE O/P EST MOD 30 MIN: CPT | Mod: 25 | Performed by: STUDENT IN AN ORGANIZED HEALTH CARE EDUCATION/TRAINING PROGRAM

## 2024-11-07 PROCEDURE — 3077F SYST BP >= 140 MM HG: CPT | Performed by: STUDENT IN AN ORGANIZED HEALTH CARE EDUCATION/TRAINING PROGRAM

## 2024-11-07 RX ORDER — AMLODIPINE BESYLATE 2.5 MG/1
2.5 TABLET ORAL
Qty: 90 TABLET | Refills: 3 | Status: SHIPPED | OUTPATIENT
Start: 2024-11-07

## 2024-11-07 RX ORDER — VALSARTAN AND HYDROCHLOROTHIAZIDE 320; 12.5 MG/1; MG/1
1 TABLET, FILM COATED ORAL
Qty: 90 TABLET | Refills: 3 | Status: SHIPPED | OUTPATIENT
Start: 2024-11-07

## 2024-11-07 RX ORDER — ZOLPIDEM TARTRATE 10 MG/1
10 TABLET ORAL NIGHTLY PRN
Qty: 20 TABLET | Refills: 0 | Status: SHIPPED | OUTPATIENT
Start: 2024-11-07 | End: 2024-12-07

## 2024-11-07 RX ORDER — VALACYCLOVIR HYDROCHLORIDE 1 G/1
1000 TABLET, FILM COATED ORAL DAILY
Qty: 90 TABLET | Refills: 3 | Status: SHIPPED | OUTPATIENT
Start: 2024-11-07

## 2024-11-07 RX ORDER — THYROID,PORK 60 MG
60 TABLET ORAL EVERY MORNING
Qty: 90 TABLET | Refills: 3 | Status: SHIPPED | OUTPATIENT
Start: 2024-11-07

## 2024-11-07 ASSESSMENT — ENCOUNTER SYMPTOMS
PALPITATIONS: 0
HEADACHES: 0
VOMITING: 0
WEIGHT LOSS: 0
DIZZINESS: 0
CHILLS: 0
SHORTNESS OF BREATH: 0
NAUSEA: 0
FEVER: 0
WHEEZING: 0

## 2024-11-07 ASSESSMENT — PATIENT HEALTH QUESTIONNAIRE - PHQ9: CLINICAL INTERPRETATION OF PHQ2 SCORE: 0

## 2024-11-07 NOTE — PROGRESS NOTES
"Subjective:     CC: Wants lab reordered 1 medication refilled    HPI:   Phillip presents today with    Patient was last seen by me 9/29/2023  PMH of HTN, HLD, IFG, CAD with known 60 to 70% LAD stenosis, history of MI 2009, hypothyroidism (on [place] Thyroid since 2009), history of TRT on antigen (3 months on 1 month off on anastrozole), primary insomnia (zolpidem 10 mg 1-2 times per month ), PSA  Specialist    Last seen by colleague 4/2024 for shingles  Patient last seen 1 year ago overall doing well blood pressure noted to be elevated today patient will monitor home blood pressure and let me know if persistently elevated over 140 will increase amlodipine to 5 or 10      Health Maintenance:     ROS:  Review of Systems   Constitutional:  Negative for chills, fever and weight loss.   HENT:  Negative for hearing loss.    Respiratory:  Negative for shortness of breath and wheezing.    Cardiovascular:  Negative for chest pain and palpitations.   Gastrointestinal:  Negative for nausea and vomiting.   Genitourinary:  Negative for frequency and urgency.   Skin:  Negative for rash.   Neurological:  Negative for dizziness and headaches.       Objective:     Exam:  BP (!) 142/90 (BP Location: Left arm)   Pulse 77   Temp 36.3 °C (97.4 °F)   Resp (!) 22   Ht 1.93 m (6' 4\")   Wt 119 kg (261 lb 7.5 oz)   SpO2 96%   BMI 31.83 kg/m²  Body mass index is 31.83 kg/m².    Physical Exam  Constitutional:       Appearance: Normal appearance.   Cardiovascular:      Rate and Rhythm: Normal rate and regular rhythm.   Pulmonary:      Effort: Pulmonary effort is normal.      Breath sounds: Normal breath sounds.   Musculoskeletal:      Cervical back: Normal range of motion and neck supple.   Lymphadenopathy:      Cervical: No cervical adenopathy.   Neurological:      Mental Status: He is alert.         Labs:     Assessment & Plan:     58 y.o. male with the following -     1. Hypothyroidism, unspecified type  Chronic, stable on [place] " Thyroid.  Will recheck TSH  - RENATA THYROID 60 MG Tab; Take 1 Tablet by mouth every morning.  Dispense: 90 Tablet; Refill: 3  - CBC WITHOUT DIFFERENTIAL; Future  - Lipid Profile; Future  - TSH WITH REFLEX TO FT4; Future  - HEMOGLOBIN A1C; Future  - Comp Metabolic Panel; Future  - MICROALBUMIN CREAT RATIO URINE; Future    2. Gastroesophageal reflux disease, unspecified whether esophagitis present  Chronic, stable  He reports he does require omeprazole 20 mg daily  - omeprazole (PRILOSEC) 20 MG delayed-release capsule; Take 1 Capsule by mouth every day.  Dispense: 90 Capsule; Refill: 3  - CBC WITHOUT DIFFERENTIAL; Future  - Lipid Profile; Future  - TSH WITH REFLEX TO FT4; Future  - HEMOGLOBIN A1C; Future  - Comp Metabolic Panel; Future  - MICROALBUMIN CREAT RATIO URINE; Future    3. Herpes zoster with ophthalmic complication, unspecified herpes zoster eye disease  Chronic, stable  - valacyclovir (VALTREX) 1 GM Tab; Take 1 Tablet by mouth every day.  Dispense: 90 Tablet; Refill: 3    4. Primary hypertension  Chronic, stable  Elevated blood pressure recommend patient to monitor his blood pressure with goal blood pressure 130s he will let me know if his blood pressure is persistently elevated above 140s and may consider increasing amlodipine to 5 mg  - amLODIPine (NORVASC) 2.5 MG Tab; Take 1 Tablet by mouth every day.  Dispense: 90 Tablet; Refill: 3  - valsartan-hydrochlorothiazide (DIOVAN-HCT) 320-12.5 MG per tablet; Take 1 Tablet by mouth every day.  Dispense: 90 Tablet; Refill: 3  - CBC WITHOUT DIFFERENTIAL; Future  - Lipid Profile; Future  - TSH WITH REFLEX TO FT4; Future  - HEMOGLOBIN A1C; Future  - Comp Metabolic Panel; Future  - MICROALBUMIN CREAT RATIO URINE; Future    5. Coronary artery disease involving native coronary artery of native heart without angina pectoris  Chronic, stable on aspirin atorvastatin 80 mg daily last LDL 72 2 years ago.  - CBC WITHOUT DIFFERENTIAL; Future  - Lipid Profile; Future  - TSH  WITH REFLEX TO FT4; Future  - HEMOGLOBIN A1C; Future  - Comp Metabolic Panel; Future  - MICROALBUMIN CREAT RATIO URINE; Future    6. Obesity (BMI 30-39.9)    - CBC WITHOUT DIFFERENTIAL; Future  - Lipid Profile; Future  - TSH WITH REFLEX TO FT4; Future  - HEMOGLOBIN A1C; Future  - Comp Metabolic Panel; Future  - MICROALBUMIN CREAT RATIO URINE; Future  - Patient identified as having weight management issue.  Appropriate orders and counseling given.    7. Prostate cancer screening  Will screen for PSA patient is on TRT  - PROSTATE SPECIFIC AG SCREENING; Future    8. Dyslipidemia  Chronic, stable LDL 72 2 years ago  - CBC WITHOUT DIFFERENTIAL; Future  - Lipid Profile; Future  - TSH WITH REFLEX TO FT4; Future  - HEMOGLOBIN A1C; Future  - Comp Metabolic Panel; Future  - MICROALBUMIN CREAT RATIO URINE; Future    9. Need for vaccination    - INFLUENZA VACCINE TRI INJ (PF)      No follow-ups on file.    Please note that this dictation was created using voice recognition software. I have made every reasonable attempt to correct obvious errors, but I expect that there are errors of grammar and possibly content that I did not discover before finalizing the note.

## 2025-01-13 ENCOUNTER — APPOINTMENT (OUTPATIENT)
Dept: MEDICAL GROUP | Facility: MEDICAL CENTER | Age: 60
End: 2025-01-13
Payer: COMMERCIAL

## 2025-01-30 DIAGNOSIS — I10 PRIMARY HYPERTENSION: ICD-10-CM

## 2025-01-30 RX ORDER — VALSARTAN AND HYDROCHLOROTHIAZIDE 320; 12.5 MG/1; MG/1
1 TABLET, FILM COATED ORAL
Qty: 90 TABLET | Refills: 3 | Status: SHIPPED | OUTPATIENT
Start: 2025-01-30

## 2025-01-30 NOTE — TELEPHONE ENCOUNTER
Received request via: Pharmacy    Was the patient seen in the last year in this department? Yes    Does the patient have an active prescription (recently filled or refills available) for medication(s) requested? No    Pharmacy Name:   To be filled at: [id number] - [place], [place] - [address] AT [address]              Does the patient have long-term Plus and need 100-day supply? (This applies to ALL medications) Patient does not have SCP

## 2025-03-17 ENCOUNTER — APPOINTMENT (OUTPATIENT)
Dept: MEDICAL GROUP | Facility: MEDICAL CENTER | Age: 60
End: 2025-03-17
Payer: COMMERCIAL

## 2025-04-16 NOTE — TELEPHONE ENCOUNTER
Is the patient due for a refill? Yes    Was the patient seen the past year? Yes    Date of last office visit: 3/16/23    Does the patient have an upcoming appointment?  No    Provider to refill:AL, ADD    Does the patients insurance require a 100 day supply?  No    
No

## 2025-04-29 ENCOUNTER — HOSPITAL ENCOUNTER (OUTPATIENT)
Facility: MEDICAL CENTER | Age: 60
End: 2025-04-29
Attending: STUDENT IN AN ORGANIZED HEALTH CARE EDUCATION/TRAINING PROGRAM
Payer: COMMERCIAL

## 2025-04-29 DIAGNOSIS — E66.9 OBESITY (BMI 30-39.9): ICD-10-CM

## 2025-04-29 DIAGNOSIS — E78.5 DYSLIPIDEMIA: ICD-10-CM

## 2025-04-29 DIAGNOSIS — I10 PRIMARY HYPERTENSION: ICD-10-CM

## 2025-04-29 DIAGNOSIS — Z12.5 PROSTATE CANCER SCREENING: ICD-10-CM

## 2025-04-29 DIAGNOSIS — K21.9 GASTROESOPHAGEAL REFLUX DISEASE, UNSPECIFIED WHETHER ESOPHAGITIS PRESENT: ICD-10-CM

## 2025-04-29 DIAGNOSIS — E03.9 HYPOTHYROIDISM, UNSPECIFIED TYPE: Chronic | ICD-10-CM

## 2025-04-29 DIAGNOSIS — I25.10 CORONARY ARTERY DISEASE INVOLVING NATIVE CORONARY ARTERY OF NATIVE HEART WITHOUT ANGINA PECTORIS: Chronic | ICD-10-CM

## 2025-04-29 LAB
ALBUMIN SERPL BCP-MCNC: 4.4 G/DL (ref 3.2–4.9)
ALBUMIN/GLOB SERPL: 1.5 G/DL
ALP SERPL-CCNC: 59 U/L (ref 30–99)
ALT SERPL-CCNC: 37 U/L (ref 2–50)
ANION GAP SERPL CALC-SCNC: 11 MMOL/L (ref 7–16)
AST SERPL-CCNC: 30 U/L (ref 12–45)
BILIRUB SERPL-MCNC: 0.9 MG/DL (ref 0.1–1.5)
BUN SERPL-MCNC: 14 MG/DL (ref 8–22)
CALCIUM ALBUM COR SERPL-MCNC: 9.8 MG/DL (ref 8.5–10.5)
CALCIUM SERPL-MCNC: 10.1 MG/DL (ref 8.5–10.5)
CHLORIDE SERPL-SCNC: 101 MMOL/L (ref 96–112)
CHOLEST SERPL-MCNC: 149 MG/DL (ref 100–199)
CO2 SERPL-SCNC: 26 MMOL/L (ref 20–33)
CREAT SERPL-MCNC: 0.94 MG/DL (ref 0.5–1.4)
ERYTHROCYTE [DISTWIDTH] IN BLOOD BY AUTOMATED COUNT: 45.5 FL (ref 35.9–50)
EST. AVERAGE GLUCOSE BLD GHB EST-MCNC: 117 MG/DL
FASTING STATUS PATIENT QL REPORTED: NORMAL
GFR SERPLBLD CREATININE-BSD FMLA CKD-EPI: 92 ML/MIN/1.73 M 2
GLOBULIN SER CALC-MCNC: 2.9 G/DL (ref 1.9–3.5)
GLUCOSE SERPL-MCNC: 106 MG/DL (ref 65–99)
HBA1C MFR BLD: 5.7 % (ref 4–5.6)
HCT VFR BLD AUTO: 47.1 % (ref 42–52)
HDLC SERPL-MCNC: 50 MG/DL
HGB BLD-MCNC: 16.4 G/DL (ref 14–18)
LDLC SERPL CALC-MCNC: 77 MG/DL
MCH RBC QN AUTO: 33.1 PG (ref 27–33)
MCHC RBC AUTO-ENTMCNC: 34.8 G/DL (ref 32.3–36.5)
MCV RBC AUTO: 95.2 FL (ref 81.4–97.8)
PLATELET # BLD AUTO: 224 K/UL (ref 164–446)
PMV BLD AUTO: 10.7 FL (ref 9–12.9)
POTASSIUM SERPL-SCNC: 4.2 MMOL/L (ref 3.6–5.5)
PROT SERPL-MCNC: 7.3 G/DL (ref 6–8.2)
PSA SERPL DL<=0.01 NG/ML-MCNC: 0.6 NG/ML (ref 0–4)
RBC # BLD AUTO: 4.95 M/UL (ref 4.7–6.1)
SODIUM SERPL-SCNC: 138 MMOL/L (ref 135–145)
T4 FREE SERPL-MCNC: 0.9 NG/DL (ref 0.93–1.7)
TRIGL SERPL-MCNC: 112 MG/DL (ref 0–149)
TSH SERPL DL<=0.005 MIU/L-ACNC: 11.2 UIU/ML (ref 0.38–5.33)
WBC # BLD AUTO: 9.6 K/UL (ref 4.8–10.8)

## 2025-04-29 PROCEDURE — 80053 COMPREHEN METABOLIC PANEL: CPT

## 2025-04-29 PROCEDURE — 80061 LIPID PANEL: CPT

## 2025-04-29 PROCEDURE — 84443 ASSAY THYROID STIM HORMONE: CPT

## 2025-04-29 PROCEDURE — 83036 HEMOGLOBIN GLYCOSYLATED A1C: CPT

## 2025-04-29 PROCEDURE — 85027 COMPLETE CBC AUTOMATED: CPT

## 2025-04-29 PROCEDURE — 36415 COLL VENOUS BLD VENIPUNCTURE: CPT

## 2025-04-29 PROCEDURE — 84439 ASSAY OF FREE THYROXINE: CPT

## 2025-04-29 PROCEDURE — 84153 ASSAY OF PSA TOTAL: CPT

## 2025-04-30 ENCOUNTER — APPOINTMENT (OUTPATIENT)
Dept: MEDICAL GROUP | Facility: MEDICAL CENTER | Age: 60
End: 2025-04-30
Payer: COMMERCIAL

## 2025-04-30 VITALS
DIASTOLIC BLOOD PRESSURE: 82 MMHG | SYSTOLIC BLOOD PRESSURE: 146 MMHG | OXYGEN SATURATION: 96 % | HEIGHT: 76 IN | TEMPERATURE: 98.4 F | BODY MASS INDEX: 30.78 KG/M2 | RESPIRATION RATE: 13 BRPM | HEART RATE: 69 BPM | WEIGHT: 252.8 LBS

## 2025-04-30 DIAGNOSIS — F41.9 ANXIETY: ICD-10-CM

## 2025-04-30 DIAGNOSIS — Z00.00 WELLNESS EXAMINATION: ICD-10-CM

## 2025-04-30 DIAGNOSIS — I10 PRIMARY HYPERTENSION: ICD-10-CM

## 2025-04-30 DIAGNOSIS — Z79.890 ENCOUNTER FOR MONITORING TESTOSTERONE REPLACEMENT THERAPY: ICD-10-CM

## 2025-04-30 DIAGNOSIS — Z51.81 ENCOUNTER FOR MONITORING TESTOSTERONE REPLACEMENT THERAPY: ICD-10-CM

## 2025-04-30 DIAGNOSIS — N52.9 ERECTILE DYSFUNCTION, UNSPECIFIED ERECTILE DYSFUNCTION TYPE: ICD-10-CM

## 2025-04-30 DIAGNOSIS — E66.9 OBESITY (BMI 30-39.9): ICD-10-CM

## 2025-04-30 DIAGNOSIS — Z91.89 AT RISK FOR SLEEP APNEA: ICD-10-CM

## 2025-04-30 DIAGNOSIS — E03.9 HYPOTHYROIDISM, UNSPECIFIED TYPE: Chronic | ICD-10-CM

## 2025-04-30 RX ORDER — THYROID,PORK 60 MG
60 TABLET ORAL EVERY MORNING
Qty: 90 TABLET | Refills: 3 | Status: SHIPPED | OUTPATIENT
Start: 2025-04-30

## 2025-04-30 RX ORDER — SILDENAFIL 50 MG/1
50 TABLET, FILM COATED ORAL
Qty: 10 TABLET | Refills: 3 | Status: SHIPPED | OUTPATIENT
Start: 2025-04-30 | End: 2025-04-30

## 2025-04-30 RX ORDER — SILDENAFIL 50 MG/1
50 TABLET, FILM COATED ORAL
Qty: 30 TABLET | Refills: 3 | Status: SHIPPED | OUTPATIENT
Start: 2025-04-30

## 2025-04-30 RX ORDER — AMLODIPINE BESYLATE 5 MG/1
5 TABLET ORAL
Qty: 90 TABLET | Refills: 3 | Status: SHIPPED | OUTPATIENT
Start: 2025-04-30

## 2025-04-30 RX ORDER — THYROID 15 MG/1
15 TABLET ORAL DAILY
Qty: 90 TABLET | Refills: 3 | Status: SHIPPED | OUTPATIENT
Start: 2025-04-30

## 2025-04-30 RX ORDER — ESCITALOPRAM OXALATE 10 MG/1
10 TABLET ORAL DAILY
Qty: 90 TABLET | Refills: 3 | Status: SHIPPED | OUTPATIENT
Start: 2025-04-30

## 2025-04-30 SDOH — ECONOMIC STABILITY: FOOD INSECURITY: WITHIN THE PAST 12 MONTHS, THE FOOD YOU BOUGHT JUST DIDN'T LAST AND YOU DIDN'T HAVE MONEY TO GET MORE.: NEVER TRUE

## 2025-04-30 SDOH — HEALTH STABILITY: PHYSICAL HEALTH: ON AVERAGE, HOW MANY MINUTES DO YOU ENGAGE IN EXERCISE AT THIS LEVEL?: 50 MIN

## 2025-04-30 SDOH — ECONOMIC STABILITY: FOOD INSECURITY: WITHIN THE PAST 12 MONTHS, YOU WORRIED THAT YOUR FOOD WOULD RUN OUT BEFORE YOU GOT MONEY TO BUY MORE.: NEVER TRUE

## 2025-04-30 SDOH — ECONOMIC STABILITY: INCOME INSECURITY: IN THE LAST 12 MONTHS, WAS THERE A TIME WHEN YOU WERE NOT ABLE TO PAY THE MORTGAGE OR RENT ON TIME?: NO

## 2025-04-30 SDOH — HEALTH STABILITY: PHYSICAL HEALTH: ON AVERAGE, HOW MANY DAYS PER WEEK DO YOU ENGAGE IN MODERATE TO STRENUOUS EXERCISE (LIKE A BRISK WALK)?: 3 DAYS

## 2025-04-30 SDOH — HEALTH STABILITY: MENTAL HEALTH
STRESS IS WHEN SOMEONE FEELS TENSE, NERVOUS, ANXIOUS, OR CAN'T SLEEP AT NIGHT BECAUSE THEIR MIND IS TROUBLED. HOW STRESSED ARE YOU?: VERY MUCH

## 2025-04-30 SDOH — ECONOMIC STABILITY: INCOME INSECURITY: HOW HARD IS IT FOR YOU TO PAY FOR THE VERY BASICS LIKE FOOD, HOUSING, MEDICAL CARE, AND HEATING?: NOT VERY HARD

## 2025-04-30 ASSESSMENT — SOCIAL DETERMINANTS OF HEALTH (SDOH)
HOW OFTEN DO YOU HAVE A DRINK CONTAINING ALCOHOL: 4 OR MORE TIMES A WEEK
DO YOU BELONG TO ANY CLUBS OR ORGANIZATIONS SUCH AS CHURCH GROUPS UNIONS, FRATERNAL OR ATHLETIC GROUPS, OR SCHOOL GROUPS?: NO
HOW OFTEN DO YOU GET TOGETHER WITH FRIENDS OR RELATIVES?: ONCE A WEEK
HOW OFTEN DO YOU ATTENT MEETINGS OF THE CLUB OR ORGANIZATION YOU BELONG TO?: NEVER
HOW HARD IS IT FOR YOU TO PAY FOR THE VERY BASICS LIKE FOOD, HOUSING, MEDICAL CARE, AND HEATING?: NOT VERY HARD
HOW MANY DRINKS CONTAINING ALCOHOL DO YOU HAVE ON A TYPICAL DAY WHEN YOU ARE DRINKING: 1 OR 2
WITHIN THE PAST 12 MONTHS, YOU WORRIED THAT YOUR FOOD WOULD RUN OUT BEFORE YOU GOT THE MONEY TO BUY MORE: NEVER TRUE
IN A TYPICAL WEEK, HOW MANY TIMES DO YOU TALK ON THE PHONE WITH FAMILY, FRIENDS, OR NEIGHBORS?: MORE THAN THREE TIMES A WEEK
HOW OFTEN DO YOU GET TOGETHER WITH FRIENDS OR RELATIVES?: ONCE A WEEK
HOW OFTEN DO YOU HAVE SIX OR MORE DRINKS ON ONE OCCASION: NEVER
HOW OFTEN DO YOU ATTEND CHURCH OR RELIGIOUS SERVICES?: NEVER
HOW OFTEN DO YOU ATTEND CHURCH OR RELIGIOUS SERVICES?: NEVER
IN A TYPICAL WEEK, HOW MANY TIMES DO YOU TALK ON THE PHONE WITH FAMILY, FRIENDS, OR NEIGHBORS?: MORE THAN THREE TIMES A WEEK
IN THE PAST 12 MONTHS, HAS THE ELECTRIC, GAS, OIL, OR WATER COMPANY THREATENED TO SHUT OFF SERVICE IN YOUR HOME?: NO
HOW OFTEN DO YOU ATTENT MEETINGS OF THE CLUB OR ORGANIZATION YOU BELONG TO?: NEVER
DO YOU BELONG TO ANY CLUBS OR ORGANIZATIONS SUCH AS CHURCH GROUPS UNIONS, FRATERNAL OR ATHLETIC GROUPS, OR SCHOOL GROUPS?: NO

## 2025-04-30 ASSESSMENT — FIBROSIS 4 INDEX: FIB4 SCORE: 1.34

## 2025-04-30 ASSESSMENT — LIFESTYLE VARIABLES
HOW MANY STANDARD DRINKS CONTAINING ALCOHOL DO YOU HAVE ON A TYPICAL DAY: 1 OR 2
HOW OFTEN DO YOU HAVE SIX OR MORE DRINKS ON ONE OCCASION: NEVER
HOW OFTEN DO YOU HAVE A DRINK CONTAINING ALCOHOL: 4 OR MORE TIMES A WEEK
AUDIT-C TOTAL SCORE: 4
SKIP TO QUESTIONS 9-10: 1

## 2025-04-30 ASSESSMENT — PATIENT HEALTH QUESTIONNAIRE - PHQ9: CLINICAL INTERPRETATION OF PHQ2 SCORE: 0

## 2025-04-30 NOTE — PROGRESS NOTES
Subjective:     CC:   Chief Complaint   Patient presents with   • Annual Exam       HPI:   Phillip Jordan is a 59 y.o. male who presents for an annual exam. He is feeling well and has no complaints.      PMH of HTN, HLD, IFG, CAD with known 60 to 70% LAD stenosis, history of MI 2009, hypothyroidism (on [place] Thyroid since 2009), history of TRT on antigen (3 months on 1 month off on anastrozole), primary insomnia (zolpidem 10 mg 1-2 times per month ), PSA  - hx working as law enforcement/ [employer]     I discussed with the patient the likelihood of costs associated with double billing for an acute & Medicare Wellness Visit. Patient is aware they may receive a bill for additional services and/or copayment.    Today  - previous seen anti- aging - previously was on testosterone  - panic attack rare, have avoid driving at night and freeways, behavioral health   -     Hypothyroidism   - on armour thyroid for past 10 years     HTN  - increased amlodipine     Testosterone was on replacement for last 4 years previously injection once weekly testosterone cypionate 160mg weekly. 10/2024 was last injection.     [place] ......        Verbal consent was acquired by the patient to use [employer] ambient listening note generation during this visit Yes   History of Present Illness  The patient presents for evaluation of hypothyroidism, hypertension, anxiety, erectile dysfunction, and onychomycosis.    Panic attacks while driving have been occurring for the past 2 years, coinciding with a job transition. These episodes have led to avoidance of freeway driving and nighttime travel. Despite never having been involved in an accident, panic attacks continue, which are attributed to vision-related issues. Meditation and de-stressing techniques have provided some relief. Approximately 5 episodes have occurred over the past 2.5 years. 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  Progressive bifocals are currently used, and occasional off-balance feelings are noted. Driving at 60 miles per hour is manageable, but nervousness occurs when merging onto the freeway from [email or web address] to 71lbs. He is open to trying medication to manage his symptoms. Several shingles outbreaks have occurred, all around the same time panic attacks began.    Overall good health is reported, but there are some minor concerns. Toenail fungus has been managed for several years using over-the-counter treatments such as Listerine and Vicks, but prescription medication may now be necessary.    Blood pressure is not monitored at home despite having the necessary equipment. Current medications for blood pressure management include valsartan, hydrochlorothiazide, and amlodipine.    Previously, an anti-aging physician prescribed testosterone cypionate 0.8 mg weekly. This treatment was discontinued approximately 6 months ago, resulting in increased lethargy and a significant decrease in libido. Resuming testosterone therapy or trying Viagra is being considered. [place] Thyroid 60 mg has been taken for the past 20 years, administered in the morning along with other medications. Lisinopril is taken at night. No sleep apnea is reported, with no snoring or sleep disturbances. Occasional daytime tiredness is noted, but no episodes of stopped breathing or waking up gasping for air. A size 18 collar is worn. Testosterone injections have been administered for the past 4 years, with the last injection given approximately 6 months ago. Viagra has been tried a few times in the past and was found effective.    No urinary issues are reported, and good hydration habits are maintained. Weight fluctuations of up to 30 pounds within a year are noted. He and his wife tried Ozempic; while his wife responded well and lost weight, he did not experience any weight loss despite using it for 6 weeks. Side effects such as nausea and diarrhea were  experienced.    SOCIAL HISTORY  He does not smoke. He uses marijuana very rarely, approximately every two months.        Health Maintenance  Advanced directive:  discussed  PT for falls prevention:  discussed  Cholesterol Screening: ldl < 100   Diabetes Screenin.7  AAA Screening:    Aspirin Use:     Family History:  Updated:   FHS-7 score:       Anticipatory Guidance  Diet:  generally well balance diet  Exercise: resistance exercise 3 x per week, some cardio   Substance Abuse:  discussed  Safe in relationship.   Seat belts, bike helmet, gun safety discussed.  Sun protection used.    Cancer screening  Colorectal Cancer Screening:   utd   Lung Cancer Screening:   na  Prostate Cancer Screening/PSA:  discussed    Infectious disease screening/Immunizations  --STI Screening: discussed  --Practices safe sex.  --HIV Screening:  discussed  --Hepatitis C Screening:  discussed  --Immunizations:    Influenza:  discussed   HPV:  aged out     Tetanus:  discussed   Shingles: n/a    Pneumococcal :   discussed       Hepatitis B:   COVID-19:   Other immunizations:      He  has a past medical history of CAD (coronary artery disease) (2012), Enlarged prostate, Heart attack (HCC), Hyperlipidemia (2012), Hypertension, and Thyroid disease.  He  has a past surgical history that includes other neurological surg; mastectomy; and vasectomy.  Family History   Problem Relation Age of Onset   • Heart Disease Mother    • Hypertension Mother    • Hyperlipidemia Mother    • Stroke Mother    • Heart Disease Father         40s, 72s   • Hypertension Father    • Hyperlipidemia Father    • Stroke Father    • Breast Cancer Sister    • Cancer Neg Hx    • Ovarian Cancer Neg Hx    • Tubal Cancer Neg Hx    • Peritoneal Cancer Neg Hx    • Colorectal Cancer Neg Hx      Social History     Tobacco Use   • Smoking status: Former     Current packs/day: 0.00     Types: Cigarettes     Quit date: 1998     Years since quittin.6   • Smokeless  tobacco: Never   Vaping Use   • Vaping status: Never Used   Substance Use Topics   • Alcohol use: Yes     Alcohol/week: 8.4 oz     Types: 14 Glasses of wine per week     Comment: 2 glass of wine daily    • Drug use: Not Currently     Types: Marijuana, Inhaled       Patient Active Problem List    Diagnosis Date Noted   • Obesity (BMI 30-39.9) 11/07/2024   • Primary insomnia 09/29/2023   • Herpes 09/29/2023   • Prediabetes 09/29/2023   • CAD (coronary artery disease) 06/06/2012   • Hyperlipidemia 06/06/2012   • Hypothyroidism 06/06/2012   • Enlarged prostate    • Hypertension        Current Outpatient Medications   Medication Sig Dispense Refill   • thyroid (ARMOUR THYROID) 15 MG Tab Take 1 Tablet by mouth every day. 90 Tablet 3   • ARMOUR THYROID 60 MG Tab Take 1 Tablet by mouth every morning. 90 Tablet 3   • amLODIPine (NORVASC) 5 MG Tab Take 1 Tablet by mouth every day. 90 Tablet 3   • sildenafil citrate (VIAGRA) 50 MG tablet Take 1 Tablet by mouth 1 time a day as needed for Erectile Dysfunction. 10 Tablet 3   • escitalopram (LEXAPRO) 10 MG Tab Take 1 Tablet by mouth every day. 90 Tablet 3   • valsartan-hydrochlorothiazide (DIOVAN-HCT) 320-12.5 MG per tablet TAKE 1 TABLET BY MOUTH EVERY DAY 90 Tablet 3   • omeprazole (PRILOSEC) 20 MG delayed-release capsule Take 1 Capsule by mouth every day. 90 Capsule 3   • valacyclovir (VALTREX) 1 GM Tab Take 1 Tablet by mouth every day. 90 Tablet 3   • atorvastatin (LIPITOR) 80 MG tablet TAKE 1 TABLET BY MOUTH EVERY EVENING 90 Tablet 3   • aspirin 81 MG EC tablet Take 81 mg by mouth every day.     • coenzyme Q-10 30 MG capsule Take 60 mg by mouth every day.       No current facility-administered medications for this visit.    (including changes today)  Allergies: Nkda [no known drug allergy]    Review of Systems   Constitutional: Negative for fever, chills and malaise/fatigue.   HENT: Negative for congestion.    Eyes: Negative for pain.   Respiratory: Negative for cough and  "shortness of breath.    Cardiovascular: Negative for leg swelling.   Gastrointestinal: Negative for nausea, vomiting, abdominal pain and diarrhea.   Genitourinary: Negative for dysuria and hematuria.   Skin: Negative for rash.   Neurological: Negative for dizziness, focal weakness and headaches.   Endo/Heme/Allergies: Does not bleed easily.   Psychiatric/Behavioral: Negative for depression.  The patient is not nervous/anxious.      Objective:     BP (!) 146/82   Pulse 69   Temp 36.9 °C (98.4 °F) (Temporal)   Resp 13   Ht 1.93 m (6' 4\")   Wt 115 kg (252 lb 12.8 oz)   SpO2 96%   BMI 30.77 kg/m²   Body mass index is 30.77 kg/m².  Wt Readings from Last 4 Encounters:   04/30/25 115 kg (252 lb 12.8 oz)   11/07/24 119 kg (261 lb 7.5 oz)   04/09/24 114 kg (252 lb 6.4 oz)   09/29/23 110 kg (242 lb 6.4 oz)       Physical Exam:  Physical Exam  Constitutional:       Appearance: Normal appearance.   Cardiovascular:      Rate and Rhythm: Normal rate and regular rhythm.   Pulmonary:      Effort: Pulmonary effort is normal.      Breath sounds: Normal breath sounds.   Musculoskeletal:      Cervical back: Normal range of motion and neck supple.   Lymphadenopathy:      Cervical: No cervical adenopathy.   Neurological:      Mental Status: He is alert.        Assessment and Plan:     1. Wellness examination      2. Anxiety  - escitalopram (LEXAPRO) 10 MG Tab; Take 1 Tablet by mouth every day.  Dispense: 90 Tablet; Refill: 3    3. Hypothyroidism, unspecified type  - thyroid (ARMOUR THYROID) 15 MG Tab; Take 1 Tablet by mouth every day.  Dispense: 90 Tablet; Refill: 3  - ARMOUR THYROID 60 MG Tab; Take 1 Tablet by mouth every morning.  Dispense: 90 Tablet; Refill: 3    4. Primary hypertension  - amLODIPine (NORVASC) 5 MG Tab; Take 1 Tablet by mouth every day.  Dispense: 90 Tablet; Refill: 3    5. At risk for sleep apnea      6. Obesity (BMI 30-39.9)  - Patient identified as having weight management issue.  Appropriate orders and " counseling given.    7. Erectile dysfunction, unspecified erectile dysfunction type  - sildenafil citrate (VIAGRA) 50 MG tablet; Take 1 Tablet by mouth 1 time a day as needed for Erectile Dysfunction.  Dispense: 10 Tablet; Refill: 3    8. Encounter for monitoring testosterone replacement therapy  - testosterone lab     Assessment & Plan  1. Anxiety.  His anxiety appears to be situational, triggered by specific circumstances rather than a constant presence. It does not align with the typical presentation of general anxiety disorder.  A consultation with a psychologist was recommended to identify potential triggers for his symptoms.  Additionally, a low-dose daily medication regimen was suggested. Lexapro 10 mg once daily in the morning was prescribed.  He was advised to inform his partner about this new medication so she can monitor for any mood changes. If there are any acute changes in mood or other intolerable side effects, he should discontinue the medication and report back.    2. Hypothyroidism.  His thyroid function has been suboptimal, potentially contributing to his fatigue. His last two lab results indicate that his current thyroid medication dosage may be insufficient.  The [place] Thyroid dosage will be increased from 60 mg to 75 mg daily.  He was instructed to take this medication with water first thing in the morning and wait at least 30 minutes before consuming any other substances.  A repeat blood work will be conducted in 6 weeks to reassess thyroid function.    3. Hypertension.  His blood pressure was elevated during the last visit and remains so today.  The amlodipine dosage will be increased from 2.5 mg to 5 mg daily.  He was advised to monitor his blood pressure at home.    4. Suspected sleep apnea.  He presents with risk factors for sleep apnea, including high blood pressure, age over 50, and a collar size of 17.5-18 inches.  A home sleep study was ordered to investigate the potential role of  sleep apnea in his other medical conditions.    5. Erectile dysfunction.  A prescription for Viagra was provided.  He was advised to inquire about potential coupons from the pharmacist as insurance may not cover this medication.    6. Onychomycosis.  A prescription for an antifungal medication was provided to treat the toenail fungus.    7. Health maintenance.  He was advised to discuss the shingles and pneumonia vaccines with his pharmacy and insurance provider to determine the most cost-effective option.    Follow-up  The patient will follow up in 6 weeks to 3 months.      HCM: .  Labs per orders.  Vaccinations per orders.  Counseling about diet, supplements, exercise, skin care and safe sex.    Referral for genetic research was offered. Patient .    Follow-up: Return in about 3 months (around 7/30/2025) for Lab review, Med check, Controlled Substance management-= test.

## 2025-07-30 ENCOUNTER — APPOINTMENT (OUTPATIENT)
Dept: MEDICAL GROUP | Facility: MEDICAL CENTER | Age: 60
End: 2025-07-30
Payer: COMMERCIAL

## 2025-09-22 ENCOUNTER — APPOINTMENT (OUTPATIENT)
Dept: MEDICAL GROUP | Facility: MEDICAL CENTER | Age: 60
End: 2025-09-22
Payer: COMMERCIAL